# Patient Record
Sex: MALE | Race: WHITE
[De-identification: names, ages, dates, MRNs, and addresses within clinical notes are randomized per-mention and may not be internally consistent; named-entity substitution may affect disease eponyms.]

---

## 2017-03-24 PROBLEM — Z83.518 FAMILY HISTORY OF CATARACTS: Status: ACTIVE | Noted: 2017-03-24

## 2017-03-24 PROBLEM — Z82.49 FAMILY HISTORY OF CONGESTIVE HEART FAILURE: Status: ACTIVE | Noted: 2017-03-24

## 2017-03-24 PROBLEM — K63.5 COLON POLYP: Status: ACTIVE | Noted: 2017-03-24

## 2017-03-24 PROBLEM — Z92.89 HISTORY OF EKG: Status: RESOLVED | Noted: 2017-03-24 | Resolved: 2017-03-24

## 2017-03-24 PROBLEM — H18.609 KERATOCONUS: Status: ACTIVE | Noted: 2017-03-24

## 2017-04-11 ENCOUNTER — APPOINTMENT (OUTPATIENT)
Dept: INTERNAL MEDICINE | Facility: CLINIC | Age: 62
End: 2017-04-11

## 2017-04-11 ENCOUNTER — NON-APPOINTMENT (OUTPATIENT)
Age: 62
End: 2017-04-11

## 2017-04-11 VITALS
SYSTOLIC BLOOD PRESSURE: 128 MMHG | HEIGHT: 75 IN | WEIGHT: 256 LBS | OXYGEN SATURATION: 97 % | DIASTOLIC BLOOD PRESSURE: 88 MMHG | BODY MASS INDEX: 31.83 KG/M2 | HEART RATE: 88 BPM | RESPIRATION RATE: 18 BRPM | TEMPERATURE: 97.6 F

## 2017-04-11 DIAGNOSIS — N52.9 MALE ERECTILE DYSFUNCTION, UNSPECIFIED: ICD-10-CM

## 2017-05-31 ENCOUNTER — MEDICATION RENEWAL (OUTPATIENT)
Age: 62
End: 2017-05-31

## 2017-06-05 ENCOUNTER — MEDICATION RENEWAL (OUTPATIENT)
Age: 62
End: 2017-06-05

## 2017-10-08 ENCOUNTER — RECORD ABSTRACTING (OUTPATIENT)
Age: 62
End: 2017-10-08

## 2017-10-10 ENCOUNTER — APPOINTMENT (OUTPATIENT)
Dept: INTERNAL MEDICINE | Facility: CLINIC | Age: 62
End: 2017-10-10

## 2018-04-17 ENCOUNTER — APPOINTMENT (OUTPATIENT)
Dept: INTERNAL MEDICINE | Facility: CLINIC | Age: 63
End: 2018-04-17
Payer: COMMERCIAL

## 2018-04-17 VITALS
WEIGHT: 256 LBS | RESPIRATION RATE: 18 BRPM | HEART RATE: 88 BPM | HEIGHT: 75 IN | SYSTOLIC BLOOD PRESSURE: 118 MMHG | BODY MASS INDEX: 31.83 KG/M2 | OXYGEN SATURATION: 98 % | TEMPERATURE: 97.7 F | DIASTOLIC BLOOD PRESSURE: 82 MMHG

## 2018-04-17 PROCEDURE — 99396 PREV VISIT EST AGE 40-64: CPT

## 2018-04-17 RX ORDER — HYDROXYCHLOROQUINE SULFATE 200 MG/1
200 TABLET ORAL DAILY
Refills: 0 | Status: DISCONTINUED | COMMUNITY
End: 2018-04-17

## 2018-04-17 RX ORDER — LISDEXAMFETAMINE DIMESYLATE 60 MG/1
60 CAPSULE ORAL
Refills: 0 | Status: DISCONTINUED | COMMUNITY
End: 2018-04-17

## 2018-06-12 ENCOUNTER — MEDICATION RENEWAL (OUTPATIENT)
Age: 63
End: 2018-06-12

## 2018-08-08 ENCOUNTER — RESULT CHARGE (OUTPATIENT)
Age: 63
End: 2018-08-08

## 2018-08-09 ENCOUNTER — NON-APPOINTMENT (OUTPATIENT)
Age: 63
End: 2018-08-09

## 2018-08-09 ENCOUNTER — APPOINTMENT (OUTPATIENT)
Dept: INTERNAL MEDICINE | Facility: CLINIC | Age: 63
End: 2018-08-09
Payer: COMMERCIAL

## 2018-08-09 VITALS
TEMPERATURE: 98.8 F | DIASTOLIC BLOOD PRESSURE: 80 MMHG | HEIGHT: 75 IN | WEIGHT: 253 LBS | RESPIRATION RATE: 16 BRPM | OXYGEN SATURATION: 96 % | SYSTOLIC BLOOD PRESSURE: 122 MMHG | BODY MASS INDEX: 31.46 KG/M2 | HEART RATE: 84 BPM

## 2018-08-09 DIAGNOSIS — J30.9 ALLERGIC RHINITIS, UNSPECIFIED: ICD-10-CM

## 2018-08-09 PROCEDURE — 99214 OFFICE O/P EST MOD 30 MIN: CPT | Mod: 25

## 2018-08-09 PROCEDURE — 94060 EVALUATION OF WHEEZING: CPT

## 2018-08-09 RX ORDER — LORAZEPAM 0.5 MG/1
0.5 TABLET ORAL DAILY
Refills: 0 | Status: DISCONTINUED | COMMUNITY
End: 2018-08-09

## 2018-08-09 NOTE — HISTORY OF PRESENT ILLNESS
[FreeTextEntry8] : He has been experiencing intermittent cough for the past several months-year.  Cough can occur when he is anxious or talking.  Taking a deep breath often makes him cough.  No wheezing but he does feel  SOB at times.  Cough is dry with frequent throat clearing.  He does use flonase Nightly  for nasal congestion which helps.  He is unaware of other allergy symptoms.  There is no heartburn or GERD symptoms.

## 2018-08-09 NOTE — PHYSICAL EXAM
[General Appearance - Alert] : alert [General Appearance - In No Acute Distress] : in no acute distress [Neck Appearance] : the appearance of the neck was normal [Neck Cervical Mass (___cm)] : no neck mass was observed [Jugular Venous Distention Increased] : there was no jugular-venous distention [] : no respiratory distress [Auscultation Breath Sounds / Voice Sounds] : lungs were clear to auscultation bilaterally [Heart Rate And Rhythm] : heart rate was normal and rhythm regular [Heart Sounds] : normal S1 and S2 [Heart Sounds Gallop] : no gallops [Murmurs] : no murmurs [Heart Sounds Pericardial Friction Rub] : no pericardial rub [Arterial Pulses Carotid] : carotid pulses were normal with no bruits [FreeTextEntry1] : There is 1-2+ edema bilaterally

## 2018-08-09 NOTE — ASSESSMENT
[FreeTextEntry1] : \par Will start Qvar 80 2 inh bid\par Reviewed proper technique for inhaler usage and pt demonstrated use.  Side effects discussed and Good oral hygiene recommended.  \par Continue with Flonase Qd/\par FU 2 months as scheduled with PFT.  \par Call office any worsening symptoms.  \par

## 2018-08-09 NOTE — REVIEW OF SYSTEMS
[Nasal Discharge] : nasal discharge [Shortness Of Breath] : shortness of breath [Cough] : cough [Negative] : Cardiovascular [Wheezing] : no wheezing [Dyspnea on Exertion] : not dyspnea on exertion

## 2018-09-15 ENCOUNTER — RX RENEWAL (OUTPATIENT)
Age: 63
End: 2018-09-15

## 2018-10-23 ENCOUNTER — APPOINTMENT (OUTPATIENT)
Dept: INTERNAL MEDICINE | Facility: CLINIC | Age: 63
End: 2018-10-23

## 2018-10-24 ENCOUNTER — RX RENEWAL (OUTPATIENT)
Age: 63
End: 2018-10-24

## 2018-12-03 ENCOUNTER — APPOINTMENT (OUTPATIENT)
Dept: UROLOGY | Facility: CLINIC | Age: 63
End: 2018-12-03

## 2019-02-13 ENCOUNTER — RX RENEWAL (OUTPATIENT)
Age: 64
End: 2019-02-13

## 2019-04-19 ENCOUNTER — NON-APPOINTMENT (OUTPATIENT)
Age: 64
End: 2019-04-19

## 2019-04-19 ENCOUNTER — APPOINTMENT (OUTPATIENT)
Dept: CARDIOLOGY | Facility: CLINIC | Age: 64
End: 2019-04-19
Payer: COMMERCIAL

## 2019-04-19 VITALS
HEIGHT: 75 IN | TEMPERATURE: 98.2 F | SYSTOLIC BLOOD PRESSURE: 136 MMHG | BODY MASS INDEX: 31.71 KG/M2 | OXYGEN SATURATION: 95 % | HEART RATE: 96 BPM | DIASTOLIC BLOOD PRESSURE: 90 MMHG | WEIGHT: 255 LBS

## 2019-04-19 PROCEDURE — 93000 ELECTROCARDIOGRAM COMPLETE: CPT

## 2019-04-19 PROCEDURE — 99214 OFFICE O/P EST MOD 30 MIN: CPT | Mod: 25

## 2019-04-19 RX ORDER — BECLOMETHASONE DIPROPIONATE HFA 80 UG/1
80 AEROSOL, METERED RESPIRATORY (INHALATION)
Qty: 1 | Refills: 5 | Status: DISCONTINUED | COMMUNITY
Start: 2018-08-09 | End: 2019-04-19

## 2019-04-19 NOTE — DISCUSSION/SUMMARY
[FreeTextEntry1] : MV repair will order Echocardiogram today and moniter yearly\par \par Dilated AO: Will obtain echo as above ( Possible CTA Chest pending results ) \par \par Have advised Carotid US/Abd Aorta screening \par \par HTN: Controlled.  Advised weight reduction \par \par HLD: Patient Will obtain copy of labs from PCP \par \par OV 3 months

## 2019-04-19 NOTE — PHYSICAL EXAM
[General Appearance - Well Developed] : well developed [Normal Appearance] : normal appearance [Well Groomed] : well groomed [General Appearance - Well Nourished] : well nourished [No Deformities] : no deformities [General Appearance - In No Acute Distress] : no acute distress [Normal Conjunctiva] : the conjunctiva exhibited no abnormalities [] : no respiratory distress [Respiration, Rhythm And Depth] : normal respiratory rhythm and effort [Exaggerated Use Of Accessory Muscles For Inspiration] : no accessory muscle use [Auscultation Breath Sounds / Voice Sounds] : lungs were clear to auscultation bilaterally [Heart Rate And Rhythm] : heart rate and rhythm were normal [Heart Sounds] : normal S1 and S2 [Abdomen Soft] : soft [Abnormal Walk] : normal gait [Skin Color & Pigmentation] : normal skin color and pigmentation [Oriented To Time, Place, And Person] : oriented to person, place, and time [FreeTextEntry1] : No LE edema

## 2019-04-19 NOTE — REASON FOR VISIT
[Follow-Up - Clinic] : a clinic follow-up of [Hyperlipidemia] : hyperlipidemia [Hypertension] : hypertension [Medication Management] : Medication management [FreeTextEntry1] : MV repair

## 2019-04-19 NOTE — HISTORY OF PRESENT ILLNESS
[FreeTextEntry1] : Here today in routine cardiac follow up after a 3 year absence \par No CP/SOB/Palpitations\par Labs with PCP

## 2019-05-13 ENCOUNTER — APPOINTMENT (OUTPATIENT)
Dept: CARDIOLOGY | Facility: CLINIC | Age: 64
End: 2019-05-13
Payer: COMMERCIAL

## 2019-05-13 PROCEDURE — 93306 TTE W/DOPPLER COMPLETE: CPT

## 2019-05-16 ENCOUNTER — APPOINTMENT (OUTPATIENT)
Dept: CARDIOLOGY | Facility: CLINIC | Age: 64
End: 2019-05-16
Payer: COMMERCIAL

## 2019-05-16 PROCEDURE — 93880 EXTRACRANIAL BILAT STUDY: CPT

## 2019-05-21 ENCOUNTER — APPOINTMENT (OUTPATIENT)
Dept: CARDIOLOGY | Facility: CLINIC | Age: 64
End: 2019-05-21
Payer: COMMERCIAL

## 2019-05-21 PROCEDURE — 93978 VASCULAR STUDY: CPT

## 2019-06-03 ENCOUNTER — RX RENEWAL (OUTPATIENT)
Age: 64
End: 2019-06-03

## 2019-06-13 ENCOUNTER — APPOINTMENT (OUTPATIENT)
Dept: INTERNAL MEDICINE | Facility: CLINIC | Age: 64
End: 2019-06-13
Payer: COMMERCIAL

## 2019-06-13 ENCOUNTER — APPOINTMENT (OUTPATIENT)
Dept: CARDIOLOGY | Facility: CLINIC | Age: 64
End: 2019-06-13

## 2019-06-13 ENCOUNTER — INPATIENT (INPATIENT)
Facility: HOSPITAL | Age: 64
LOS: 4 days | Discharge: HOME | End: 2019-06-18
Attending: INTERNAL MEDICINE | Admitting: INTERNAL MEDICINE
Payer: COMMERCIAL

## 2019-06-13 VITALS
TEMPERATURE: 100.5 F | HEART RATE: 124 BPM | SYSTOLIC BLOOD PRESSURE: 114 MMHG | HEIGHT: 74 IN | DIASTOLIC BLOOD PRESSURE: 72 MMHG | RESPIRATION RATE: 20 BRPM | BODY MASS INDEX: 32.98 KG/M2 | WEIGHT: 257 LBS | OXYGEN SATURATION: 95 %

## 2019-06-13 VITALS — HEIGHT: 75 IN | WEIGHT: 255.07 LBS

## 2019-06-13 DIAGNOSIS — L03.116 CELLULITIS OF LEFT LOWER LIMB: ICD-10-CM

## 2019-06-13 DIAGNOSIS — Z98.890 OTHER SPECIFIED POSTPROCEDURAL STATES: Chronic | ICD-10-CM

## 2019-06-13 LAB
ALBUMIN SERPL ELPH-MCNC: 3.4 G/DL — SIGNIFICANT CHANGE UP (ref 3.3–5)
ALP SERPL-CCNC: 83 U/L — SIGNIFICANT CHANGE UP (ref 40–120)
ALT FLD-CCNC: 42 U/L — SIGNIFICANT CHANGE UP (ref 12–78)
ANION GAP SERPL CALC-SCNC: 9 MMOL/L — SIGNIFICANT CHANGE UP (ref 5–17)
APPEARANCE UR: CLEAR — SIGNIFICANT CHANGE UP
APTT BLD: 28.9 SEC — SIGNIFICANT CHANGE UP (ref 27.5–36.3)
AST SERPL-CCNC: 48 U/L — HIGH (ref 15–37)
BACTERIA # UR AUTO: ABNORMAL
BASOPHILS # BLD AUTO: 0.05 K/UL — SIGNIFICANT CHANGE UP (ref 0–0.2)
BASOPHILS NFR BLD AUTO: 0.4 % — SIGNIFICANT CHANGE UP (ref 0–2)
BILIRUB SERPL-MCNC: 1.2 MG/DL — SIGNIFICANT CHANGE UP (ref 0.2–1.2)
BILIRUB UR-MCNC: ABNORMAL
BUN SERPL-MCNC: 21 MG/DL — SIGNIFICANT CHANGE UP (ref 7–23)
CALCIUM SERPL-MCNC: 9 MG/DL — SIGNIFICANT CHANGE UP (ref 8.5–10.1)
CHLORIDE SERPL-SCNC: 102 MMOL/L — SIGNIFICANT CHANGE UP (ref 96–108)
CO2 SERPL-SCNC: 24 MMOL/L — SIGNIFICANT CHANGE UP (ref 22–31)
COLOR SPEC: YELLOW — SIGNIFICANT CHANGE UP
COMMENT - URINE: SIGNIFICANT CHANGE UP
CREAT SERPL-MCNC: 1.47 MG/DL — HIGH (ref 0.5–1.3)
DIFF PNL FLD: ABNORMAL
EOSINOPHIL # BLD AUTO: 0 K/UL — SIGNIFICANT CHANGE UP (ref 0–0.5)
EOSINOPHIL NFR BLD AUTO: 0 % — SIGNIFICANT CHANGE UP (ref 0–6)
EPI CELLS # UR: NEGATIVE — SIGNIFICANT CHANGE UP
GLUCOSE SERPL-MCNC: 115 MG/DL — HIGH (ref 70–99)
GLUCOSE UR QL: NEGATIVE MG/DL — SIGNIFICANT CHANGE UP
HCT VFR BLD CALC: 42.9 % — SIGNIFICANT CHANGE UP (ref 39–50)
HGB BLD-MCNC: 14.6 G/DL — SIGNIFICANT CHANGE UP (ref 13–17)
IMM GRANULOCYTES NFR BLD AUTO: 0.9 % — SIGNIFICANT CHANGE UP (ref 0–1.5)
INR BLD: 1.23 RATIO — HIGH (ref 0.88–1.16)
KETONES UR-MCNC: ABNORMAL
LACTATE SERPL-SCNC: 1 MMOL/L — SIGNIFICANT CHANGE UP (ref 0.7–2)
LEUKOCYTE ESTERASE UR-ACNC: ABNORMAL
LYMPHOCYTES # BLD AUTO: 0.92 K/UL — LOW (ref 1–3.3)
LYMPHOCYTES # BLD AUTO: 6.8 % — LOW (ref 13–44)
MCHC RBC-ENTMCNC: 30.7 PG — SIGNIFICANT CHANGE UP (ref 27–34)
MCHC RBC-ENTMCNC: 34 GM/DL — SIGNIFICANT CHANGE UP (ref 32–36)
MCV RBC AUTO: 90.1 FL — SIGNIFICANT CHANGE UP (ref 80–100)
MONOCYTES # BLD AUTO: 0.49 K/UL — SIGNIFICANT CHANGE UP (ref 0–0.9)
MONOCYTES NFR BLD AUTO: 3.6 % — SIGNIFICANT CHANGE UP (ref 2–14)
NEUTROPHILS # BLD AUTO: 11.91 K/UL — HIGH (ref 1.8–7.4)
NEUTROPHILS NFR BLD AUTO: 88.3 % — HIGH (ref 43–77)
NITRITE UR-MCNC: NEGATIVE — SIGNIFICANT CHANGE UP
PH UR: 6.5 — SIGNIFICANT CHANGE UP (ref 5–8)
PLATELET # BLD AUTO: 129 K/UL — LOW (ref 150–400)
POTASSIUM SERPL-MCNC: 4.5 MMOL/L — SIGNIFICANT CHANGE UP (ref 3.5–5.3)
POTASSIUM SERPL-SCNC: 4.5 MMOL/L — SIGNIFICANT CHANGE UP (ref 3.5–5.3)
PROT SERPL-MCNC: 7.5 GM/DL — SIGNIFICANT CHANGE UP (ref 6–8.3)
PROT UR-MCNC: 100 MG/DL
PROTHROM AB SERPL-ACNC: 13.7 SEC — HIGH (ref 10–12.9)
RBC # BLD: 4.76 M/UL — SIGNIFICANT CHANGE UP (ref 4.2–5.8)
RBC # FLD: 12.3 % — SIGNIFICANT CHANGE UP (ref 10.3–14.5)
RBC CASTS # UR COMP ASSIST: ABNORMAL /HPF (ref 0–4)
SODIUM SERPL-SCNC: 135 MMOL/L — SIGNIFICANT CHANGE UP (ref 135–145)
SP GR SPEC: 1.01 — SIGNIFICANT CHANGE UP (ref 1.01–1.02)
UROBILINOGEN FLD QL: 8 MG/DL
WBC # BLD: 13.49 K/UL — HIGH (ref 3.8–10.5)
WBC # FLD AUTO: 13.49 K/UL — HIGH (ref 3.8–10.5)
WBC UR QL: SIGNIFICANT CHANGE UP

## 2019-06-13 PROCEDURE — 93010 ELECTROCARDIOGRAM REPORT: CPT

## 2019-06-13 PROCEDURE — 93971 EXTREMITY STUDY: CPT | Mod: 26,LT

## 2019-06-13 PROCEDURE — 99285 EMERGENCY DEPT VISIT HI MDM: CPT

## 2019-06-13 PROCEDURE — 99213 OFFICE O/P EST LOW 20 MIN: CPT

## 2019-06-13 PROCEDURE — 71045 X-RAY EXAM CHEST 1 VIEW: CPT | Mod: 26

## 2019-06-13 RX ORDER — TAMSULOSIN HYDROCHLORIDE 0.4 MG/1
1 CAPSULE ORAL
Qty: 0 | Refills: 0 | DISCHARGE

## 2019-06-13 RX ORDER — CEFAZOLIN SODIUM 1 G
1000 VIAL (EA) INJECTION EVERY 8 HOURS
Refills: 0 | Status: DISCONTINUED | OUTPATIENT
Start: 2019-06-13 | End: 2019-06-13

## 2019-06-13 RX ORDER — TAMSULOSIN HYDROCHLORIDE 0.4 MG/1
0.4 CAPSULE ORAL DAILY
Refills: 0 | Status: DISCONTINUED | OUTPATIENT
Start: 2019-06-13 | End: 2019-06-18

## 2019-06-13 RX ORDER — VANCOMYCIN HCL 1 G
1750 VIAL (EA) INTRAVENOUS ONCE
Refills: 0 | Status: COMPLETED | OUTPATIENT
Start: 2019-06-13 | End: 2019-06-13

## 2019-06-13 RX ORDER — OMEGA-3 ACID ETHYL ESTERS 1 G
2 CAPSULE ORAL
Qty: 0 | Refills: 0 | DISCHARGE

## 2019-06-13 RX ORDER — CEFAZOLIN SODIUM 1 G
2000 VIAL (EA) INJECTION ONCE
Refills: 0 | Status: COMPLETED | OUTPATIENT
Start: 2019-06-13 | End: 2019-06-13

## 2019-06-13 RX ORDER — UBIDECARENONE 100 MG
100 CAPSULE ORAL
Qty: 0 | Refills: 0 | DISCHARGE

## 2019-06-13 RX ORDER — SODIUM CHLORIDE 9 MG/ML
1000 INJECTION, SOLUTION INTRAVENOUS
Refills: 0 | Status: DISCONTINUED | OUTPATIENT
Start: 2019-06-13 | End: 2019-06-15

## 2019-06-13 RX ORDER — ACETAMINOPHEN 500 MG
650 TABLET ORAL ONCE
Refills: 0 | Status: COMPLETED | OUTPATIENT
Start: 2019-06-13 | End: 2019-06-13

## 2019-06-13 RX ORDER — ATORVASTATIN CALCIUM 80 MG/1
20 TABLET, FILM COATED ORAL DAILY
Refills: 0 | Status: DISCONTINUED | OUTPATIENT
Start: 2019-06-13 | End: 2019-06-18

## 2019-06-13 RX ORDER — ROSUVASTATIN CALCIUM 5 MG/1
1 TABLET ORAL
Qty: 0 | Refills: 0 | DISCHARGE

## 2019-06-13 RX ORDER — LOSARTAN POTASSIUM 100 MG/1
1 TABLET, FILM COATED ORAL
Qty: 0 | Refills: 0 | DISCHARGE

## 2019-06-13 RX ORDER — CHOLECALCIFEROL (VITAMIN D3) 125 MCG
2 CAPSULE ORAL
Qty: 0 | Refills: 0 | DISCHARGE

## 2019-06-13 RX ORDER — FLUTICASONE PROPIONATE 50 MCG
1 SPRAY, SUSPENSION NASAL
Qty: 0 | Refills: 0 | DISCHARGE

## 2019-06-13 RX ORDER — SODIUM CHLORIDE 9 MG/ML
3600 INJECTION, SOLUTION INTRAVENOUS ONCE
Refills: 0 | Status: COMPLETED | OUTPATIENT
Start: 2019-06-13 | End: 2019-06-13

## 2019-06-13 RX ORDER — FLUTICASONE PROPIONATE 50 MCG
1 SPRAY, SUSPENSION NASAL
Refills: 0 | Status: DISCONTINUED | OUTPATIENT
Start: 2019-06-13 | End: 2019-06-18

## 2019-06-13 RX ORDER — MUPIROCIN 20 MG/G
1 OINTMENT TOPICAL
Refills: 0 | Status: DISCONTINUED | OUTPATIENT
Start: 2019-06-13 | End: 2019-06-18

## 2019-06-13 RX ORDER — CEFAZOLIN SODIUM 1 G
1000 VIAL (EA) INJECTION EVERY 8 HOURS
Refills: 0 | Status: DISCONTINUED | OUTPATIENT
Start: 2019-06-13 | End: 2019-06-14

## 2019-06-13 RX ORDER — ACETAMINOPHEN 500 MG
1000 TABLET ORAL EVERY 6 HOURS
Refills: 0 | Status: DISCONTINUED | OUTPATIENT
Start: 2019-06-13 | End: 2019-06-18

## 2019-06-13 RX ADMIN — Medication 1000 MILLIGRAM(S): at 21:01

## 2019-06-13 RX ADMIN — SODIUM CHLORIDE 3600 MILLILITER(S): 9 INJECTION, SOLUTION INTRAVENOUS at 11:27

## 2019-06-13 RX ADMIN — Medication 2000 MILLIGRAM(S): at 11:57

## 2019-06-13 RX ADMIN — Medication 1 APPLICATION(S): at 19:23

## 2019-06-13 RX ADMIN — Medication 100 MILLIGRAM(S): at 11:27

## 2019-06-13 RX ADMIN — Medication 250 MILLIGRAM(S): at 12:03

## 2019-06-13 RX ADMIN — Medication 650 MILLIGRAM(S): at 11:28

## 2019-06-13 RX ADMIN — ATORVASTATIN CALCIUM 20 MILLIGRAM(S): 80 TABLET, FILM COATED ORAL at 21:01

## 2019-06-13 RX ADMIN — SODIUM CHLORIDE 100 MILLILITER(S): 9 INJECTION, SOLUTION INTRAVENOUS at 17:36

## 2019-06-13 RX ADMIN — Medication 1000 MILLIGRAM(S): at 21:37

## 2019-06-13 RX ADMIN — Medication 1000 MILLIGRAM(S): at 21:07

## 2019-06-13 RX ADMIN — TAMSULOSIN HYDROCHLORIDE 0.4 MILLIGRAM(S): 0.4 CAPSULE ORAL at 21:01

## 2019-06-13 NOTE — REVIEW OF SYSTEMS
[Chills] : chills [Fever] : fever [Fatigue] : fatigue [Skin Rash] : skin rash [Negative] : Psychiatric [de-identified] : see HPi

## 2019-06-13 NOTE — H&P ADULT - ATTENDING COMMENTS
Hospitalist Update Note    Patient seen and examined, case discussed in depth with NP and agree with H&P stated above along with the following:     Alee 63 y.o M admitted for LLE cellulitis. Patient endorses last had cellulitis in same leg 20 years ago at which point he was hospitalized for 5 days on IV abx. NO CP, mild LLE pain.     VS reviewed.   Gen: awake, alert, NAD, nontoxic appearing  CV: +S1, S2, RRR  Resp: CTA B/L  Abd: soft/NT/ND, +BS  Ext: LLE + erythema with edema from foot throughout calf, mild pain. Intact motor strength.     A&P     # LLE Cellulitis - discussed with podiatry plan for ongoing IV abx, elevation of leg.   - doppler US neg for DVT  - continue IV Ancef, f/u final blood cultures.   - consult ID for recs.    Rest per above.

## 2019-06-13 NOTE — H&P ADULT - HISTORY OF PRESENT ILLNESS
63 year old Man with hx of Mitral valve repair 2009 at [facility], HTN, HLD, BPH appendectomy, referred to ED from PCP's office for LLE redness/swelling, pain, fever (101), chills with onset Tuesday. Denies CP/palpitation/SOB/HA/dizziness/abd pain/n/v/d, has abrasion to left heel from ill fitting shoe he recently bought, walks barefooted outdoor his property at times; no recent travels.    In the ED t ((.0 , /70, RR 19, O2 sat 94% rm, lactate 1.0, WBC 13.49, platelet 129, crt 1.47, Ua no WBC, few bacteria, negative nitrile trace LE. He received ancef IVPB 2gm, vanco 1750 IVP x1, tylenol 650mg x1, and LR 2L bolus

## 2019-06-13 NOTE — H&P ADULT - ASSESSMENT
LLE cellulitis / pain likely staph infection w/ cracked plantar areas  hx of recurrent LLE cellulitis last episode was 20 yrs ago  f/u blood culture  ancef 1gm Q 8hrs  vitals per routine  LLE ultrasound to r/o DVT  ck crp / sed rate  ck A1c  monitor for now / consult ID if needed in AM     Thrombocytopenia ? acute in the setting of infection vs chronic  baseline 166 in 2015  monitor for now  ck CBC n am    CARLOS likely d/t volume depletion  Ua does not indicate UTI  denies dysuria/urgency/ has increase urination HS chronically in setting of BPH  hold home dose losartan today  start  cc/ hr x 24hrs  monitor BMP    L heel superficial abrasion  local wound care w/ bacitracin    Onychomycosis  podiatry consult  Lamisil oral or topical if oral not formulary    HTN - controlled  borderline low  monitor VS per floor routine    Dyslipidemia  con't home dose crestor    Dispo  goals of care d/w patient --> full code  pt and fiancee agreed with discussed plan of care

## 2019-06-13 NOTE — PHYSICAL EXAM
[No Acute Distress] : no acute distress [Well Nourished] : well nourished [Well Developed] : well developed [Supple] : supple [No Lymphadenopathy] : no lymphadenopathy [No Respiratory Distress] : no respiratory distress  [Clear to Auscultation] : lungs were clear to auscultation bilaterally [No Accessory Muscle Use] : no accessory muscle use [Normal Rate] : normal rate  [Regular Rhythm] : with a regular rhythm [Normal S1, S2] : normal S1 and S2 [Pedal Pulses Present] : the pedal pulses are present [Normal Posterior Cervical Nodes] : no posterior cervical lymphadenopathy [Normal Anterior Cervical Nodes] : no anterior cervical lymphadenopathy [de-identified] : left ankle extending to knee + erythema, + warmth, + swelling. pt has small 1/2 inch superficial  open area on heel, no drainage noted

## 2019-06-13 NOTE — ED ADULT NURSE NOTE - OBJECTIVE STATEMENT
Pt. c/o of pain to LLE, redness and swelling. Pt. states he has had fever for the last few days and felt tired. Pt. S.O. reports he was very "grey last night" pt. went to PCP and was sent in for eval. Pt. took 1000mg tylenol yesterday and nyquil to sleep last night. Pt. took nothing today. Pt. reports feeling slight HA with dizziness, chest discomfort. pt. denies abd. pain, n/v/d/.

## 2019-06-13 NOTE — H&P ADULT - NSHPPHYSICALEXAM_GEN_ALL_CORE
PHYSICAL EXAM:    GENERAL: NAD    HEENT:  pupils equal and reactive, EOMI, no oropharyngeal lesions, erythema, exudates, oral thrush    NECK:   supple, no carotid bruits, no palpable lymph nodes, no thyromegaly    CV:  +S1, +S2, regular,  no m/c/r    RESP:   lungs clear to auscultation bilaterally, no wheezing, rales, rhonchi, good air entry bilaterally    BREAST:  not examined    GI:  abdomen soft, non-tender, non-distended, normal BS, no bruits, no abdominal masses, no palpable masses    RECTAL:  not examined    :  not examined    MSK: normal muscle tone, no atrophy, no rigidity, no contractions    EXT: chronic vascular changes to BLE,  no clubbing, no cyanosis, no edema, no calf pain, swelling or erythema    VASCULAR:  diminished palpable DP/PT to LLE otherwise normal pulses equal and symmetric in the upper and right lower extremities    NEURO:  AAOX3, no focal neurological deficits, follows all commands, able to move extremities spontaneously    SKIN: plantar area of BLE with cracked dry areas, L great toe fungal appearing underneath nailbed, Left leg moderately swollen, erythemous from wrist of leg upward below the knee + tenderness extending to thigh, back of L heel with superficial abrasion

## 2019-06-13 NOTE — ED ADULT TRIAGE NOTE - CHIEF COMPLAINT QUOTE
patient reports cellulitis in LLE sent by Dr Sahni for evaluation and tx.  States he has been febrile for the past few days.  100.5 today

## 2019-06-13 NOTE — ED STATDOCS - CLINICAL SUMMARY MEDICAL DECISION MAKING FREE TEXT BOX
63 M with fever and LLE cellulitis. Labs, blood cultures, IV abx. Admit. 63 M with fever and LLE cellulitis. Labs, EKG, blood cultures, IV abx. Admit.

## 2019-06-13 NOTE — HISTORY OF PRESENT ILLNESS
[FreeTextEntry8] : Pt presents  to the office today with complaints of left lower extremity redness and swelling extending from ankle to thigh. Pt reports felt ill two days ago with fever ( 101 F) and chills and noticed his left calf was red and swollen. He does have a history of cellulitis twice in the past, requiring hospitalization and IV antibiotics . Today in the office his temp is 100.6 F .\par  He denies shortness of breath, chest pain, pleuritic pain , nausea, vomiting. \par

## 2019-06-13 NOTE — ED STATDOCS - OBJECTIVE STATEMENT
64 y/o M with a PMHx of HTN, HLD, appendectomy, mitral valve repair presenting to the ED sent by MD Sahni for evaluation and treatment of possible cellulitis in LLE. Pt states he has also had a fever for the past 2 days. Pt notes that 2 days ago he experienced the chills, became pale, and developed a fever. Last night, pt states that his L leg began to hurt and he noticed redness. This morning, pt saw MD Sahni who sent pt to ED. Pt notes he has an open blister on his L heel from new shoes. Tetanus vaccine UTD. Denies N/V, tobacco use, or EtOH use. Pt is not anticoagulated. NKDA.

## 2019-06-13 NOTE — ED STATDOCS - PROGRESS NOTE DETAILS
64 y/o M presents with L leg pain. Pt reports fevers/rigors since tuesday. Reports L leg pain and and redness, similar to prior episodes of cellulitis. Pt saw Dr. edward in this morning and was sent to ED. -Rell Bui PA-C Will give fluids based on IBW for total of 2520cc. -Rell Bui PA-C Will admit for sepsis secondary to cellulitis. Case discussed with Dr. jameson. -Rell Bui PA-C

## 2019-06-13 NOTE — CONSULT NOTE ADULT - SUBJECTIVE AND OBJECTIVE BOX
CHIEF COMPLAINT: Fungal Toenails  Date of Service: 6/13/19     S : 63 year old Male seen at bedside for evaluation of feet by Podiatry. Patient states he was sent in by his PMD for redness and swelling of the left leg which he states began 1 day ago. Patient relates approximately 2 weeks ago he notices a small wound had developed on his L heel which he believes was caused by wearing new shoewear which he states was too small for his feet. Patient denies any drainage from the wound at any time. Patient reports he has not seen an outside Podiatrist for treatment. Patient denies any f/n/v/c/sob/cp at this time.    PMH: HTN (hypertension)  HLD (hyperlipidemia)  HTN (hypertension)  HLD (hyperlipidemia)    PSH:S/P MVR (mitral valve repair)      Allergies:No Known Allergies    Labs:                          14.6   13.49 )-----------( 129      ( 13 Jun 2019 11:11 )             42.9     WBC Trend  13.49<H> Date (06-13 @ 11:11)      Chem  06-13    135  |  102  |  21  ----------------------------<  115<H>  4.5   |  24  |  1.47<H>    Ca    9.0      13 Jun 2019 11:11    TPro  7.5  /  Alb  3.4  /  TBili  1.2  /  DBili  x   /  AST  48<H>  /  ALT  42  /  AlkPhos  83  06-13    Vitals:  T(F): 98.4 (06-13-19 @ 16:11), Max: 99.3 (06-13-19 @ 10:40)  HR: 78 (06-13-19 @ 16:11) (78 - 113)  BP: 112/69 (06-13-19 @ 16:11) (108/70 - 112/69)  RR: 16 (06-13-19 @ 16:11) (16 - 19)  SpO2: 95% (06-13-19 @ 16:11) (94% - 96%)  Wt(kg): --    REVIEW OF SYSTEMS:    CONSTITUTIONAL: No weakness, fevers or chills  EYES/ENT: No visual changes;  No vertigo or throat pain   NECK: No pain or stiffness  RESPIRATORY: No cough, wheezing, hemoptysis; No shortness of breath  CARDIOVASCULAR: No chest pain or palpitations  GASTROINTESTINAL: No abdominal or epigastric pain. No nausea, vomiting, or hematemesis; No diarrhea or constipation. No melena or hematochezia.  GENITOURINARY: No dysuria, frequency or hematuria  NEUROLOGICAL: No numbness or weakness  SKIN: Wound to right heel  All other review of systems is negative unless indicated above    Physical Exam:   Constitutional: NAD, alert;  Derm:    Ulceration medial aspect of L heel, fibrogranular in nature, - hyperkeratotic border, wound size approx. 0.8 cm X 1.1 cm X  0.1 cm in depth, + edema, + riki-wound erythema, - purulence, - fluctuance, - tracking/tunneling, - probe to bone, no active drainage. Erythema noted to the periwound area of the left heel wound as well as to the LLE.  Toenails dystrophic and yellow x 10 with subungual debris. Hyperkeratotic tissue at the plantar aspects of the toe pulps of digits 4+5 b/l.   Vascular: Dorsalis Pedis and Posterior Tibial pulses 1/4 b/l.  Capillary re-fill time less then 3 seconds digits 1-5 bilateral.    Neuro: Protective sensation intact to the level of the digits bilateral.  MSK: Muscle strength 5/5 all major muscle groups bilateral. Mild pain elicited with squeezing of left calf.

## 2019-06-13 NOTE — ED ADULT NURSE REASSESSMENT NOTE - NS ED NURSE REASSESS COMMENT FT1
patient provided with menu to order lunch. no change in condition. comfort and safety maintained. patient awaiting admission orders.

## 2019-06-13 NOTE — CONSULT NOTE ADULT - ASSESSMENT
Assessment:  Patient is a 64 Y/O Male seen by Podiatry for the followin. Fibrogranular wound to left heel, superficial in nature  2. LLE cellulitis  3. Onychomycosis of toenails 1-10  4. Pain in the b/l feet and toes secondary to onychomycosis 1-10     P:   Chart reviewed and Patient evaluated by Podiatry dep.  Discussed diagnosis and treatment with patient  Wound to L heel cleansed with normal saline  Wound to left heel was dressed with DSD to left heel. Bacitracin, 4x4 gauze, ABD pad, and Kerlix applied to left heel wound.  Aseptic mechanical debridement of toenails x 10 performed with sterile nail clippers.   Clotrimazole cream ordered by Medicine team. Nursing order placed for clotrimazole cream to be applied to toenails 1-10 every morning and night.   Order for Bactroban to be applied to left heel placed.  Care by Medicine appreciated.  Venous duplex result reviewed; neg. for DVT in LLE.   Offloading to bilateral Heels encouraged by explaining to patient he should apply pillows underneath calfs to suspend b/l heels in air for offloading.   Nursing order placed to dispense surgical shoe to left foot for offloading. Patient is to wear surgical shoe to left foot at all times whenever ambulating.   Discussed importance of daily foot examinations and proper shoe gear.  Patient demonstrated verbal understanding of all interventions and tolerated interventions well without any complications.   Podiatry will follow while in house.

## 2019-06-14 LAB
ANION GAP SERPL CALC-SCNC: 10 MMOL/L — SIGNIFICANT CHANGE UP (ref 5–17)
BUN SERPL-MCNC: 17 MG/DL — SIGNIFICANT CHANGE UP (ref 7–23)
CALCIUM SERPL-MCNC: 8.5 MG/DL — SIGNIFICANT CHANGE UP (ref 8.5–10.1)
CHLORIDE SERPL-SCNC: 107 MMOL/L — SIGNIFICANT CHANGE UP (ref 96–108)
CO2 SERPL-SCNC: 24 MMOL/L — SIGNIFICANT CHANGE UP (ref 22–31)
CREAT SERPL-MCNC: 1.25 MG/DL — SIGNIFICANT CHANGE UP (ref 0.5–1.3)
CRP SERPL-MCNC: 18.67 MG/DL — HIGH (ref 0–0.4)
ERYTHROCYTE [SEDIMENTATION RATE] IN BLOOD: 35 MM/HR — HIGH (ref 0–20)
GLUCOSE SERPL-MCNC: 144 MG/DL — HIGH (ref 70–99)
HCT VFR BLD CALC: 38.1 % — LOW (ref 39–50)
HCV AB S/CO SERPL IA: 0.08 S/CO — SIGNIFICANT CHANGE UP (ref 0–0.99)
HCV AB SERPL-IMP: SIGNIFICANT CHANGE UP
HGB BLD-MCNC: 12.8 G/DL — LOW (ref 13–17)
MCHC RBC-ENTMCNC: 30.5 PG — SIGNIFICANT CHANGE UP (ref 27–34)
MCHC RBC-ENTMCNC: 33.6 GM/DL — SIGNIFICANT CHANGE UP (ref 32–36)
MCV RBC AUTO: 90.7 FL — SIGNIFICANT CHANGE UP (ref 80–100)
PLATELET # BLD AUTO: 108 K/UL — LOW (ref 150–400)
POTASSIUM SERPL-MCNC: 3.9 MMOL/L — SIGNIFICANT CHANGE UP (ref 3.5–5.3)
POTASSIUM SERPL-SCNC: 3.9 MMOL/L — SIGNIFICANT CHANGE UP (ref 3.5–5.3)
RBC # BLD: 4.2 M/UL — SIGNIFICANT CHANGE UP (ref 4.2–5.8)
RBC # FLD: 12.4 % — SIGNIFICANT CHANGE UP (ref 10.3–14.5)
SODIUM SERPL-SCNC: 141 MMOL/L — SIGNIFICANT CHANGE UP (ref 135–145)
WBC # BLD: 7.52 K/UL — SIGNIFICANT CHANGE UP (ref 3.8–10.5)
WBC # FLD AUTO: 7.52 K/UL — SIGNIFICANT CHANGE UP (ref 3.8–10.5)

## 2019-06-14 RX ORDER — VANCOMYCIN HCL 1 G
1250 VIAL (EA) INTRAVENOUS EVERY 12 HOURS
Refills: 0 | Status: DISCONTINUED | OUTPATIENT
Start: 2019-06-14 | End: 2019-06-18

## 2019-06-14 RX ORDER — CEFAZOLIN SODIUM 1 G
2000 VIAL (EA) INJECTION EVERY 8 HOURS
Refills: 0 | Status: DISCONTINUED | OUTPATIENT
Start: 2019-06-14 | End: 2019-06-18

## 2019-06-14 RX ORDER — HEPARIN SODIUM 5000 [USP'U]/ML
5000 INJECTION INTRAVENOUS; SUBCUTANEOUS EVERY 8 HOURS
Refills: 0 | Status: DISCONTINUED | OUTPATIENT
Start: 2019-06-14 | End: 2019-06-18

## 2019-06-14 RX ORDER — CEFAZOLIN SODIUM 1 G
2000 VIAL (EA) INJECTION EVERY 8 HOURS
Refills: 0 | Status: DISCONTINUED | OUTPATIENT
Start: 2019-06-14 | End: 2019-06-14

## 2019-06-14 RX ORDER — VANCOMYCIN HCL 1 G
1250 VIAL (EA) INTRAVENOUS ONCE
Refills: 0 | Status: COMPLETED | OUTPATIENT
Start: 2019-06-14 | End: 2019-06-14

## 2019-06-14 RX ORDER — VANCOMYCIN HCL 1 G
VIAL (EA) INTRAVENOUS
Refills: 0 | Status: DISCONTINUED | OUTPATIENT
Start: 2019-06-14 | End: 2019-06-18

## 2019-06-14 RX ADMIN — Medication 100 MILLIGRAM(S): at 21:15

## 2019-06-14 RX ADMIN — HEPARIN SODIUM 5000 UNIT(S): 5000 INJECTION INTRAVENOUS; SUBCUTANEOUS at 21:15

## 2019-06-14 RX ADMIN — Medication 1000 MILLIGRAM(S): at 06:00

## 2019-06-14 RX ADMIN — SODIUM CHLORIDE 100 MILLILITER(S): 9 INJECTION, SOLUTION INTRAVENOUS at 02:30

## 2019-06-14 RX ADMIN — TAMSULOSIN HYDROCHLORIDE 0.4 MILLIGRAM(S): 0.4 CAPSULE ORAL at 11:33

## 2019-06-14 RX ADMIN — Medication 166.67 MILLIGRAM(S): at 18:01

## 2019-06-14 RX ADMIN — MUPIROCIN 1 APPLICATION(S): 20 OINTMENT TOPICAL at 09:08

## 2019-06-14 RX ADMIN — ATORVASTATIN CALCIUM 20 MILLIGRAM(S): 80 TABLET, FILM COATED ORAL at 11:33

## 2019-06-14 RX ADMIN — Medication 1 SPRAY(S): at 21:16

## 2019-06-14 RX ADMIN — Medication 100 MILLIGRAM(S): at 14:06

## 2019-06-14 RX ADMIN — Medication 1 APPLICATION(S): at 18:02

## 2019-06-14 RX ADMIN — Medication 166.67 MILLIGRAM(S): at 11:33

## 2019-06-14 RX ADMIN — Medication 1 APPLICATION(S): at 06:00

## 2019-06-14 NOTE — CONSULT NOTE ADULT - ASSESSMENT
63 year old Man with hx of Mitral valve repair 2009 at [facility], HTN, HLD, BPH appendectomy, referred to ED from PCP's office for LLE redness/swelling, pain, fever (101), chills with onset 6/11. Denies CP/palpitation/SOB/HA/dizziness/abd pain/n/v/d, has abrasion to left heel from ill fitting shoe he recently bought, walks barefooted outdoor his property at times; no recent travels. In the ED t (.0 , /70, RR 19, O2 sat 94% rm, lactate 1.0, WBC 13.49, platelet 129, crt 1.47, Ua no WBC, few bacteria, negative nitrile trace LE. He received ancef IVPB 2gm, vanco 1750 IVP x1, tylenol 650mg x1, and LR 2L bolus.     1. LLE cellulitis. toenail onychomycosis  - agree with vancomycin [id number] check trough prior to 4th dose  - agree with ancef 2gmq8h  - f/u cultures  - podiatry eval appreciated  - continue with topical clotrimazole  - monitor temps  - tolerating abx well so far; no side effects noted  - reason for abx use and side effects reviewed with patient  -supportive care  - fu cbc  - on dc plan for po keflex [id number] to complete abx course

## 2019-06-14 NOTE — CONSULT NOTE ADULT - SUBJECTIVE AND OBJECTIVE BOX
Patient is a 63y old  Male who presents with a chief complaint of LLE cellulits (2019 09:38)    HPI:  63 year old Man with hx of Mitral valve repair  at [facility], HTN, HLD, BPH appendectomy, referred to ED from PCP's office for LLE redness/swelling, pain, fever (101), chills with onset . Denies CP/palpitation/SOB/HA/dizziness/abd pain/n/v/d, has abrasion to left heel from ill fitting shoe he recently bought, walks barefooted outdoor his property at times; no recent travels. In the ED t (.0 , /70, RR 19, O2 sat 94% rm, lactate 1.0, WBC 13.49, platelet 129, crt 1.47, Ua no WBC, few bacteria, negative nitrile trace LE. He received ancef IVPB 2gm, vanco 1750 IVP x1, tylenol 650mg x1, and LR 2L bolus.       PMH: as above  PSH: as above  Meds: per reconciliation sheet, noted below  MEDICATIONS  (STANDING):  atorvastatin 20 milliGRAM(s) Oral daily  ceFAZolin  Injectable. 1000 milliGRAM(s) IV Push every 8 hours  clotrimazole 1% Cream 1 Application(s) Topical two times a day  mupirocin 2% Ointment 1 Application(s) Topical <User Schedule>  sodium chloride 0.45%. 1000 milliLiter(s) (100 mL/Hr) IV Continuous <Continuous>  tamsulosin 0.4 milliGRAM(s) Oral daily  vancomycin  IVPB 1250 milliGRAM(s) IV Intermittent once  vancomycin  IVPB 1250 milliGRAM(s) IV Intermittent every 12 hours  vancomycin  IVPB        Allergies    No Known Allergies    Intolerances      Social: no smoking, no alcohol, no illegal drugs; no recent travel, no exposure to TB  FAMILY HISTORY:  FH: brain tumor     no history of premature cardiovascular disease in first degree relatives    ROS: no HA, no dizziness, no sore throat, no blurry vision, no CP, no palpitations, no SOB, no cough, no abdominal pain, no diarrhea, no N/V, no dysuria, no leg pain, no claudication, no rectal pain or bleeding, no night sweats  All other systems reviewed and are negative    Vital Signs Last 24 Hrs  T(C): 36.9 (2019 11:02), Max: 38.2 (2019 21:33)  T(F): 98.4 (2019 11:02), Max: 100.7 (2019 21:33)  HR: 80 (2019 11:02) (78 - 85)  BP: 139/74 (2019 11:02) (112/68 - 148/81)  BP(mean): --  RR: 18 (2019 11:02) (16 - 18)  SpO2: 97% (2019 11:02) (92% - 99%)  Daily         PE:  Constitutional: frail looking  HEENT: NC/AT, EOMI, PERRLA, conjunctivae clear; ears and nose atraumatic; pharynx benign  Neck: supple; thyroid not palpable  Back: no tenderness  Respiratory: respiratory effort normal; clear to auscultation  Cardiovascular: S1S2 regular, no murmurs  Abdomen: soft, not tender, not distended, positive BS; liver and spleen WNL  Genitourinary: no suprapubic tenderness  Lymphatic: no LN palpable  Musculoskeletal: no muscle tenderness, no joint swelling or tenderness  Extremities: no pedal edema  Neurological/ Psychiatric:  moving all extremities  Skin: LLE redness/swelling, warmth toe nail changes onychomycosis no palpable lesions    Labs: all available labs reviewed                        12.8   7.52  )-----------( 108      ( 2019 08:19 )             38.1     06-14    141  |  107  |  17  ----------------------------<  144<H>  3.9   |  24  |  1.25    Ca    8.5      2019 08:19    TPro  7.5  /  Alb  3.4  /  TBili  1.2  /  DBili  x   /  AST  48<H>  /  ALT  42  /  AlkPhos  83  06-13     LIVER FUNCTIONS - ( 2019 11:11 )  Alb: 3.4 g/dL / Pro: 7.5 gm/dL / ALK PHOS: 83 U/L / ALT: 42 U/L / AST: 48 U/L / GGT: x           Urinalysis Basic - ( 2019 11:11 )    Color: Yellow / Appearance: Clear / S.010 / pH: x  Gluc: x / Ketone: Trace  / Bili: Small / Urobili: 8 mg/dL   Blood: x / Protein: 100 mg/dL / Nitrite: Negative   Leuk Esterase: Trace / RBC: 3-5 /HPF / WBC 3-5   Sq Epi: x / Non Sq Epi: Negative / Bacteria: Few          Radiology: all available radiological tests reviewed    EXAM:  US DPLX LWR EXT VEINS LTD LT                            PROCEDURE DATE:  2019          INTERPRETATION:  CLINICAL INFORMATION: Left lower extremity swelling   redness and tenderness    COMPARISON: None available.    TECHNIQUE: Duplex sonography of the LEFT LOWER extremity with color and   spectral Doppler, with and without compression.      FINDINGS:    There is normal compressibility of the left common femoral, femoral and   popliteal veins.     The contralateral common femoral vein is patent.    Doppler examination shows normal spontaneous and phasic flow.    No calf vein thrombosis is detected.    IMPRESSION:     No evidence of left lower extremity deep venous thrombosis.      Advanced directives addressed: full resuscitation

## 2019-06-14 NOTE — PROGRESS NOTE ADULT - SUBJECTIVE AND OBJECTIVE BOX
HPI: 63 year old Man with hx of Mitral valve repair 2009 at [facility], HTN, HLD, BPH appendectomy, referred to ED from PCP's office for LLE redness/swelling, pain, fever (101), chills with onset Tuesday. Denies CP/palpitation/SOB/HA/dizziness/abd pain/n/v/d, has abrasion to left heel from ill fitting shoe he recently bought, walks barefooted outdoor his property at times; no recent travels.    : no complaints  on iv ABX      PHYSICAL EXAM:    Daily     Daily     ICU Vital Signs Last 24 Hrs  T(C): 36.9 (2019 11:02), Max: 38.2 (2019 21:33)  T(F): 98.4 (2019 11:02), Max: 100.7 (2019 21:33)  HR: 80 (2019 11:02) (79 - 83)  BP: 139/74 (2019 11:02) (129/70 - 148/81)  BP(mean): --  ABP: --  ABP(mean): --  RR: 18 (2019 11:02) (16 - 18)  SpO2: 97% (2019 11:02) (92% - 99%)      Constitutional: Well appearing  HEENT: Atraumatic, TIERRA, Normal, No congestion  Respiratory: Breath Sounds normal, no rhonchi/wheeze  Cardiovascular: N S1S2;  Gastrointestinal: Abdomen soft, non tender, Bowel Sounds present  Extremities: No edema, peripheral pulses present  Neurological: AAO x 3, no gross focal motor deficits  Skin: Non cellulitic, no rash, ulcers  Lymph Nodes: No lymphadenopathy noted  Back: No CVA tenderness   Musculoskeletal: non tender  Breasts: Deferred  Genitourinary: deferred  Rectal: Deferred                          12.8   7.52  )-----------( 108      ( 2019 08:19 )             38.1       CBC Full  -  ( 2019 08:19 )  WBC Count : 7.52 K/uL  RBC Count : 4.20 M/uL  Hemoglobin : 12.8 g/dL  Hematocrit : 38.1 %  Platelet Count - Automated : 108 K/uL  Mean Cell Volume : 90.7 fl  Mean Cell Hemoglobin : 30.5 pg  Mean Cell Hemoglobin Concentration : 33.6 gm/dL  Auto Neutrophil # : x  Auto Lymphocyte # : x  Auto Monocyte # : x  Auto Eosinophil # : x  Auto Basophil # : x  Auto Neutrophil % : x  Auto Lymphocyte % : x  Auto Monocyte % : x  Auto Eosinophil % : x  Auto Basophil % : x          141  |  107  |  17  ----------------------------<  144<H>  3.9   |  24  |  1.25    Ca    8.5      2019 08:19    TPro  7.5  /  Alb  3.4  /  TBili  1.2  /  DBili  x   /  AST  48<H>  /  ALT  42  /  AlkPhos  83  06-13      LIVER FUNCTIONS - ( 2019 11:11 )  Alb: 3.4 g/dL / Pro: 7.5 gm/dL / ALK PHOS: 83 U/L / ALT: 42 U/L / AST: 48 U/L / GGT: x             PT/INR - ( 2019 11:11 )   PT: 13.7 sec;   INR: 1.23 ratio         PTT - ( 2019 11:11 )  PTT:28.9 sec          Urinalysis Basic - ( 2019 11:11 )    Color: Yellow / Appearance: Clear / S.010 / pH: x  Gluc: x / Ketone: Trace  / Bili: Small / Urobili: 8 mg/dL   Blood: x / Protein: 100 mg/dL / Nitrite: Negative   Leuk Esterase: Trace / RBC: 3-5 /HPF / WBC 3-5   Sq Epi: x / Non Sq Epi: Negative / Bacteria: Few            MEDICATIONS  (STANDING):  atorvastatin 20 milliGRAM(s) Oral daily  ceFAZolin   IVPB 2000 milliGRAM(s) IV Intermittent every 8 hours  clotrimazole 1% Cream 1 Application(s) Topical two times a day  heparin  Injectable 5000 Unit(s) SubCutaneous every 8 hours  mupirocin 2% Ointment 1 Application(s) Topical <User Schedule>  sodium chloride 0.45%. 1000 milliLiter(s) (100 mL/Hr) IV Continuous <Continuous>  tamsulosin 0.4 milliGRAM(s) Oral daily  vancomycin  IVPB 1250 milliGRAM(s) IV Intermittent every 12 hours  vancomycin  IVPB

## 2019-06-14 NOTE — PROGRESS NOTE ADULT - SUBJECTIVE AND OBJECTIVE BOX
Date of Service: 19     S : 63 year old Male initially seen by Podiatry on  at bedside for onychomycosis. Patient stated he was sent in by his PMD for redness and swelling of the left leg which he stated began 1 day ago. Patient related approximately 2 weeks ago he noticed a small wound had developed on his L heel which he believed was caused by wearing new shoes which he stated was too small for his feet. Patient denied any drainage from the wound at any time. Patient reportd he has not seen an outside Podiatrist for treatment. Patient denied any f/n/v/c/sob/cp at this time.    : Pt was seen at bedside for Left heel wound. Pt was resting in bed and in NAD. He denies any pedal complaint. He denies any f/c/n/v/sob.    PMH: HTN (hypertension)  HLD (hyperlipidemia)  HTN (hypertension)  HLD (hyperlipidemia)    PSH: S/P MVR (mitral valve repair)      Allergies:No Known Allergies    Labs:  ( @ 11:11)                      14.6  13.49 )-----------( 129                 42.9    Neutrophils = 11.91 (88.3%)  Lymphocytes = 0.92 (6.8%)  Eosinophils = 0.00 (0.0%)  Basophils = 0.05 (0.4%)  Monocytes = 0.49 (3.6%)  Bands = --%        141  |  107  |  17  ----------------------------<  144<H>  3.9   |  24  |  1.25    Ca    8.5      2019 08:19    TPro  7.5  /  Alb  3.4  /  TBili  1.2  /  DBili  x   /  AST  48<H>  /  ALT  42  /  AlkPhos  83      ( 2019 11:11 )   PT: 13.7 sec;   INR: 1.23 ratio;       PTT:28.9 sec    Urinalysis Basic - ( 2019 11:11 )    Color: Yellow / Appearance: Clear / S.010 / pH: x  Gluc: x / Ketone: Trace  / Bili: Small / Urobili: 8 mg/dL   Blood: x / Protein: 100 mg/dL / Nitrite: Negative   Leuk Esterase: Trace / RBC: 3-5 /HPF / WBC 3-5   Sq Epi: x / Non Sq Epi: Negative / Bacteria: Few        Vital Signs Last 24 Hrs  T(C): 36.7 (2019 05:08), Max: 38.2 (2019 21:33)  T(F): 98.1 (2019 05:08), Max: 100.7 (2019 21:33)  HR: 79 (2019 05:08) (78 - 113)  BP: 148/81 (2019 05:08) (108/70 - 148/81)  BP(mean): --  RR: 17 (2019 05:08) (16 - 19)  SpO2: 99% (2019 05:08) (92% - 99%)    REVIEW OF SYSTEMS:    CONSTITUTIONAL: No weakness, fevers or chills  EYES/ENT: No visual changes;  No vertigo or throat pain   NECK: No pain or stiffness  RESPIRATORY: No cough, wheezing, hemoptysis; No shortness of breath  CARDIOVASCULAR: No chest pain or palpitations  GASTROINTESTINAL: No abdominal or epigastric pain. No nausea, vomiting, or hematemesis; No diarrhea or constipation. No melena or hematochezia.  GENITOURINARY: No dysuria, frequency or hematuria  NEUROLOGICAL: No numbness or weakness  SKIN: Wound to Left heel  All other review of systems is negative unless indicated above    Physical Exam:   Constitutional: NAD, alert;  Derm:    Ulceration medial aspect of L heel, fibrogranular in nature, - hyperkeratotic border, wound size approx. 0.8 cm X 1.1 cm X  0.1 cm in depth, + edema, + riki-wound erythema, - purulence, - fluctuance, - tracking/tunneling, - probe to bone, no active drainage. Erythema noted to the periwound area of the left heel wound as well as to the LLE.  Toenails dystrophic and yellow x 10 with subungual debris. Hyperkeratotic tissue at the plantar aspects of the toe pulps of digits 4+5 b/l.   Vascular: Dorsalis Pedis and Posterior Tibial pulses 1/4 b/l.  Capillary re-fill time less then 3 seconds digits 1-5 bilateral.    Neuro: Protective sensation intact to the level of the digits bilateral.  MSK: Muscle strength 5/5 all major muscle groups bilateral. Mild pain elicited with squeezing of left calf.

## 2019-06-15 LAB
ANION GAP SERPL CALC-SCNC: 10 MMOL/L — SIGNIFICANT CHANGE UP (ref 5–17)
BUN SERPL-MCNC: 15 MG/DL — SIGNIFICANT CHANGE UP (ref 7–23)
CALCIUM SERPL-MCNC: 8.9 MG/DL — SIGNIFICANT CHANGE UP (ref 8.5–10.1)
CHLORIDE SERPL-SCNC: 107 MMOL/L — SIGNIFICANT CHANGE UP (ref 96–108)
CO2 SERPL-SCNC: 24 MMOL/L — SIGNIFICANT CHANGE UP (ref 22–31)
CREAT SERPL-MCNC: 1.08 MG/DL — SIGNIFICANT CHANGE UP (ref 0.5–1.3)
GLUCOSE SERPL-MCNC: 110 MG/DL — HIGH (ref 70–99)
POTASSIUM SERPL-MCNC: 4 MMOL/L — SIGNIFICANT CHANGE UP (ref 3.5–5.3)
POTASSIUM SERPL-SCNC: 4 MMOL/L — SIGNIFICANT CHANGE UP (ref 3.5–5.3)
SODIUM SERPL-SCNC: 141 MMOL/L — SIGNIFICANT CHANGE UP (ref 135–145)
VANCOMYCIN TROUGH SERPL-MCNC: 12.2 UG/ML — SIGNIFICANT CHANGE UP (ref 10–20)

## 2019-06-15 RX ORDER — LOSARTAN POTASSIUM 100 MG/1
50 TABLET, FILM COATED ORAL DAILY
Refills: 0 | Status: DISCONTINUED | OUTPATIENT
Start: 2019-06-15 | End: 2019-06-18

## 2019-06-15 RX ADMIN — Medication 1 APPLICATION(S): at 17:15

## 2019-06-15 RX ADMIN — Medication 1 SPRAY(S): at 21:14

## 2019-06-15 RX ADMIN — TAMSULOSIN HYDROCHLORIDE 0.4 MILLIGRAM(S): 0.4 CAPSULE ORAL at 11:43

## 2019-06-15 RX ADMIN — Medication 100 MILLIGRAM(S): at 21:15

## 2019-06-15 RX ADMIN — Medication 1000 MILLIGRAM(S): at 15:18

## 2019-06-15 RX ADMIN — ATORVASTATIN CALCIUM 20 MILLIGRAM(S): 80 TABLET, FILM COATED ORAL at 11:43

## 2019-06-15 RX ADMIN — Medication 166.67 MILLIGRAM(S): at 06:27

## 2019-06-15 RX ADMIN — Medication 100 MILLIGRAM(S): at 05:10

## 2019-06-15 RX ADMIN — Medication 100 MILLIGRAM(S): at 14:19

## 2019-06-15 RX ADMIN — HEPARIN SODIUM 5000 UNIT(S): 5000 INJECTION INTRAVENOUS; SUBCUTANEOUS at 21:14

## 2019-06-15 RX ADMIN — HEPARIN SODIUM 5000 UNIT(S): 5000 INJECTION INTRAVENOUS; SUBCUTANEOUS at 14:18

## 2019-06-15 RX ADMIN — Medication 1000 MILLIGRAM(S): at 14:20

## 2019-06-15 RX ADMIN — Medication 166.67 MILLIGRAM(S): at 17:14

## 2019-06-15 RX ADMIN — Medication 1 APPLICATION(S): at 05:10

## 2019-06-15 RX ADMIN — HEPARIN SODIUM 5000 UNIT(S): 5000 INJECTION INTRAVENOUS; SUBCUTANEOUS at 05:10

## 2019-06-15 RX ADMIN — MUPIROCIN 1 APPLICATION(S): 20 OINTMENT TOPICAL at 11:37

## 2019-06-15 NOTE — PROGRESS NOTE ADULT - SUBJECTIVE AND OBJECTIVE BOX
HPI: 63 year old Man with hx of Mitral valve repair 2009 at [facility], HTN, HLD, BPH appendectomy, referred to ED from PCP's office for LLE redness/swelling, pain, fever (101), chills with onset Tuesday. Denies CP/palpitation/SOB/HA/dizziness/abd pain/n/v/d, has abrasion to left heel from ill fitting shoe he recently bought, walks barefooted outdoor his property at times; no recent travels.    6/14: no complaints  on iv ABX  6.15: c/o Left leg tenderness        REVIEW OF SYSTEMS:    CONSTITUTIONAL: No weakness, No fevers or chills  ENT: No ear ache, No sorethroat  NECK: No pain, No stiffness  RESPIRATORY: No cough, No wheezing, No hemoptysis; No dyspnea  CARDIOVASCULAR: No chest pain, No palpitations  GASTROINTESTINAL: No abd pain, No nausea, No vomiting, No hematemesis, No diarrhea or constipation. No melena, No hematochezia.  GENITOURINARY: No dysuria, No  hematuria  NEUROLOGICAL: No diplopia, No paresthesia, No motor dysfunction  MUSCULOSKELETAL: No arthralgia, No myalgia  SKIN: + rashe and lesions  on his Left heel  PSYCH: no anxiety, no suicidal ideation    All other review of systems is negative unless indicated above    Vital Signs Last 24 Hrs  T(C): 37 (15 Lit 2019 11:35), Max: 37.3 (14 Jun 2019 21:04)  T(F): 98.6 (15 Lit 2019 11:35), Max: 99.1 (14 Jun 2019 21:04)  HR: 78 (15 Lit 2019 11:35) (75 - 89)  BP: 137/79 (15 Lit 2019 11:35) (129/66 - 138/83)  BP(mean): --  RR: 19 (15 Lit 2019 11:35) (17 - 19)  SpO2: 96% (15 Lit 2019 11:35) (96% - 96%)    PHYSICAL EXAM:    GENERAL: NAD, Well nourished  HEENT:  NC/AT, EOMI, PERRLA, No scleral icterus, Moist mucous membranes  NECK: Supple, No JVD  CNS:  Alert & Oriented X3, Motor Strength 5/5 B/L upper and lower extremities; DTRs 2+ intact   LUNG: Normal Breath sounds, Clear to auscultation bilaterally, No rales, No rhonchi, No wheezing  HEART: RRR; No murmurs, No rubs  ABDOMEN: +BS, ST/ND/NT  GENITOURINARY: Voiding, Bladder not distended  EXTREMITIES:  2+ Peripheral Pulses, No clubbing, No cyanosis, No tibial edema  MUSCULOSKELTAL: Joints normal ROM, No TTP, No effusion  SKIN: Left leg rash, Left heel ulceration  RECTAL: deferred, not indicated  BREAST: deferred                          12.8   7.52  )-----------( 108      ( 14 Jun 2019 08:19 )             38.1     06-15    141  |  107  |  15  ----------------------------<  110<H>  4.0   |  24  |  1.08    Ca    8.9      15 Lit 2019 06:00      Vancomycin levels:   Cultures:     MEDICATIONS  (STANDING):  atorvastatin 20 milliGRAM(s) Oral daily  ceFAZolin   IVPB 2000 milliGRAM(s) IV Intermittent every 8 hours  clotrimazole 1% Cream 1 Application(s) Topical two times a day  heparin  Injectable 5000 Unit(s) SubCutaneous every 8 hours  mupirocin 2% Ointment 1 Application(s) Topical <User Schedule>  sodium chloride 0.45%. 1000 milliLiter(s) (100 mL/Hr) IV Continuous <Continuous>  tamsulosin 0.4 milliGRAM(s) Oral daily  vancomycin  IVPB 1250 milliGRAM(s) IV Intermittent every 12 hours    MEDICATIONS  (PRN):  acetaminophen   Tablet .. 1000 milliGRAM(s) Oral every 6 hours PRN Temp greater or equal to 38C (100.4F), Moderate Pain (4 - 6)  fluticasone propionate 50 MICROgram(s)/spray Nasal Spray 1 Spray(s) Both Nostrils two times a day PRN nasal congestion      all labs reviewed  all imaging reviewed    a/p:      LLE cellulitis / pain likely staph infection w/ cracked plantar areas  hx of recurrent LLE cellulitis   f/u blood culture  ancef and vanco iv day#2 as per ID  LLE ultrasound to r/o DVT; no DVT  ID consult appreciated      CARLOS likely d/t volume depletion  resolved, off fluids    L heel superficial abrasion  local wound care w/ bacitracin    Onychomycosis  podiatry consult  Lamisil oral or topical if oral not formulary    HTN - controlled    Dyslipidemia  con't home dose crestor

## 2019-06-15 NOTE — PROGRESS NOTE ADULT - SUBJECTIVE AND OBJECTIVE BOX
Date of Service: 6/15/19     S : 63 year old Male initially seen by Podiatry on  at bedside for onychomycosis. Patient stated he was sent in by his PMD for redness and swelling of the left leg which he stated began 1 day ago. Patient related approximately 2 weeks ago he noticed a small wound had developed on his L heel which he believed was caused by wearing new shoes which he stated was too small for his feet. Patient denied any drainage from the wound at any time. Patient reportd he has not seen an outside Podiatrist for treatment. Patient denied any f/n/v/c/sob/cp at this time.    : Pt was seen at bedside with Dr. Barr for Left heel wound. Pt was resting in bed and in NAD. He denies any pedal complaint. He denies any f/c/n/v/sob.    PMH: HTN (hypertension)  HLD (hyperlipidemia)  HTN (hypertension)  HLD (hyperlipidemia)    PSH: S/P MVR (mitral valve repair)      Allergies:No Known Allergies    Labs:  ( @ 08:19)                      12.8  7.52 )-----------( 108                 38.1    Neutrophils = -- (--%)  Lymphocytes = -- (--%)  Eosinophils = -- (--%)  Basophils = -- (--%)  Monocytes = -- (--%)  Bands = --%    06-15    141  |  107  |  15  ----------------------------<  110<H>  4.0   |  24  |  1.08    Ca    8.9      15 Lit 2019 06:00    TPro  7.5  /  Alb  3.4  /  TBili  1.2  /  DBili  x   /  AST  48<H>  /  ALT  42  /  AlkPhos  83  -    ( 2019 11:11 )   PT: 13.7 sec;   INR: 1.23 ratio;       PTT:28.9 sec      Urinalysis Basic - ( 2019 11:11 )    Color: Yellow / Appearance: Clear / S.010 / pH: x  Gluc: x / Ketone: Trace  / Bili: Small / Urobili: 8 mg/dL   Blood: x / Protein: 100 mg/dL / Nitrite: Negative   Leuk Esterase: Trace / RBC: 3-5 /HPF / WBC 3-5   Sq Epi: x / Non Sq Epi: Negative / Bacteria: Few          Vital Signs Last 24 Hrs  T(C): 36.9 (15 Lit 2019 05:06), Max: 37.3 (2019 21:04)  T(F): 98.5 (15 Lit 2019 05:06), Max: 99.1 (2019 21:04)  HR: 75 (15 Lit 2019 05:06) (75 - 89)  BP: 138/83 (15 Lit 2019 05:06) (129/66 - 139/74)  BP(mean): --  RR: 17 (15 Ilt 2019 05:06) (17 - 18)  SpO2: 96% (15 Lit 2019 05:06) (96% - 97%)      REVIEW OF SYSTEMS:    CONSTITUTIONAL: No weakness, fevers or chills  EYES/ENT: No visual changes;  No vertigo or throat pain   NECK: No pain or stiffness  RESPIRATORY: No cough, wheezing, hemoptysis; No shortness of breath  CARDIOVASCULAR: No chest pain or palpitations  GASTROINTESTINAL: No abdominal or epigastric pain. No nausea, vomiting, or hematemesis; No diarrhea or constipation. No melena or hematochezia.  GENITOURINARY: No dysuria, frequency or hematuria  NEUROLOGICAL: No numbness or weakness  SKIN: Wound to Left heel  All other review of systems is negative unless indicated above    Physical Exam:   Constitutional: NAD, alert;  Derm:    Ulceration medial aspect of L heel, fibrogranular in nature, - hyperkeratotic border, wound size approx. 0.8 cm X 1.1 cm X  0.1 cm in depth, + edema, + riki-wound erythema, - purulence, - fluctuance, - tracking/tunneling, - probe to bone, no active drainage. Erythema noted to the periwound area of the left heel wound as well as to the LLE.  Toenails dystrophic and yellow x 10 with subungual debris. Hyperkeratotic tissue at the plantar aspects of the toe pulps of digits 4+5 b/l.   Vascular: Dorsalis Pedis and Posterior Tibial pulses 1/4 b/l.  Capillary re-fill time less then 3 seconds digits 1-5 bilateral.    Neuro: Protective sensation intact to the level of the digits bilateral.  MSK: Muscle strength 5/5 all major muscle groups bilateral. Mild pain elicited with squeezing of left calf.

## 2019-06-15 NOTE — CDI QUERY NOTE - NSCDIOTHERTXTBX_GEN_ALL_CORE_HH
Pt. admitted with LLE cellulitis and CARLOS.    ED Provider documented Sepsis - Progress: Will admit for sepsis secondary to cellulitis.    H&P documented: LLE cellulitis / pain likely staph infection w/ cracked plantar areas    SUPPORTING DOCUMENTATION AND/OR CLINICAL EVIDENCE:    WBC 13     C Reactive elevated          Rigors    Blood cultures:    Culture - Blood (collected 06-13-19 @ 11:11)  Source: .Blood None  Preliminary Report (06-14-19 @ 16:01):    No growth to date.    Culture - Blood (collected 06-13-19 @ 11:11)  Source: .Blood None    Preliminary Report (06-14-19 @ 16:01):    No growth to date.      Antibiotics:   ceFAZolin   IVPB   100 mL/Hr IV Intermittent (06-13-19 @ 11:27)    ceFAZolin   IVPB   100 mL/Hr IV Intermittent (06-15-19 @ 05:10)   100 mL/Hr IV Intermittent (06-14-19 @ 21:15)   100 mL/Hr IV Intermittent (06-14-19 @ 14:06)    ceFAZolin  Injectable.   1000 milliGRAM(s) IV Push (06-14-19 @ 06:00)   1000 milliGRAM(s) IV Push (06-13-19 @ 21:01)    vancomycin  IVPB   250 mL/Hr IV Intermittent (06-13-19 @ 12:03)    vancomycin  IVPB   166.67 mL/Hr IV Intermittent (06-14-19 @ 11:33)    vancomycin  IVPB   166.67 mL/Hr IV Intermittent (06-15-19 @ 06:27)   166.67 mL/Hr IV Intermittent (06-14-19 @ 18:01)    lactated ringers Bolus   3600 mL/Hr IV Bolus (06-13-19 @ 11:27)    sodium chloride 0.45%.   100 mL/Hr IV Continuous (06-13-19 @ 17:36)   100 mL/Hr IV Continuous (06-13-19 @ 14:18)      STATUS:  Sepsis ruled in  Sepsis is resolving  Sepsis ruled out  Early Sepsis POA, resolved?    PRESENT ON ADMISSION:  Was sepsis present on admission?  If so, please document.

## 2019-06-16 RX ADMIN — HEPARIN SODIUM 5000 UNIT(S): 5000 INJECTION INTRAVENOUS; SUBCUTANEOUS at 13:36

## 2019-06-16 RX ADMIN — Medication 1 APPLICATION(S): at 05:09

## 2019-06-16 RX ADMIN — Medication 100 MILLIGRAM(S): at 21:07

## 2019-06-16 RX ADMIN — Medication 100 MILLIGRAM(S): at 05:08

## 2019-06-16 RX ADMIN — Medication 1 SPRAY(S): at 21:07

## 2019-06-16 RX ADMIN — HEPARIN SODIUM 5000 UNIT(S): 5000 INJECTION INTRAVENOUS; SUBCUTANEOUS at 05:09

## 2019-06-16 RX ADMIN — Medication 166.67 MILLIGRAM(S): at 17:30

## 2019-06-16 RX ADMIN — Medication 1 APPLICATION(S): at 18:41

## 2019-06-16 RX ADMIN — ATORVASTATIN CALCIUM 20 MILLIGRAM(S): 80 TABLET, FILM COATED ORAL at 11:12

## 2019-06-16 RX ADMIN — LOSARTAN POTASSIUM 50 MILLIGRAM(S): 100 TABLET, FILM COATED ORAL at 06:18

## 2019-06-16 RX ADMIN — TAMSULOSIN HYDROCHLORIDE 0.4 MILLIGRAM(S): 0.4 CAPSULE ORAL at 11:12

## 2019-06-16 RX ADMIN — MUPIROCIN 1 APPLICATION(S): 20 OINTMENT TOPICAL at 11:09

## 2019-06-16 RX ADMIN — Medication 100 MILLIGRAM(S): at 13:36

## 2019-06-16 RX ADMIN — HEPARIN SODIUM 5000 UNIT(S): 5000 INJECTION INTRAVENOUS; SUBCUTANEOUS at 21:07

## 2019-06-16 RX ADMIN — Medication 166.67 MILLIGRAM(S): at 06:18

## 2019-06-16 NOTE — PROGRESS NOTE ADULT - SUBJECTIVE AND OBJECTIVE BOX
HPI: 63 year old Man with hx of Mitral valve repair 2009 at [facility], HTN, HLD, BPH appendectomy, referred to ED from PCP's office for LLE redness/swelling, pain, fever (101), chills with onset Tuesday. Denies CP/palpitation/SOB/HA/dizziness/abd pain/n/v/d, has abrasion to left heel from ill fitting shoe he recently bought, walks barefooted outdoor his property at times; no recent travels.    6/14: no complaints  on iv ABX  6.15: c/o Left leg tenderness  6.16: less leg tenderness, less redness         REVIEW OF SYSTEMS:    CONSTITUTIONAL: No weakness, No fevers or chills  ENT: No ear ache, No sorethroat  NECK: No pain, No stiffness  RESPIRATORY: No cough, No wheezing, No hemoptysis; No dyspnea  CARDIOVASCULAR: No chest pain, No palpitations  GASTROINTESTINAL: No abd pain, No nausea, No vomiting, No hematemesis, No diarrhea or constipation. No melena, No hematochezia.  GENITOURINARY: No dysuria, No  hematuria  NEUROLOGICAL: No diplopia, No paresthesia, No motor dysfunction  MUSCULOSKELETAL: No arthralgia, No myalgia  SKIN: + rashe and lesions  on his Left heel  PSYCH: no anxiety, no suicidal ideation    All other review of systems is negative unless indicated above    Vital Signs Last 24 Hrs  T(C): 36.9 (16 Jun 2019 11:28), Max: 37.1 (16 Jun 2019 04:53)  T(F): 98.5 (16 Jun 2019 11:28), Max: 98.7 (16 Jun 2019 04:53)  HR: 77 (16 Jun 2019 11:28) (77 - 81)  BP: 130/76 (16 Jun 2019 11:28) (126/71 - 147/81)  RR: 18 (16 Jun 2019 11:28) (18 - 18)  SpO2: 95% (16 Jun 2019 11:28) (95% - 95%)    PHYSICAL EXAM:    GENERAL: NAD, Well nourished  HEENT:  NC/AT, EOMI, PERRLA, No scleral icterus, Moist mucous membranes  NECK: Supple, No JVD  CNS:  Alert & Oriented X3, Motor Strength 5/5 B/L upper and lower extremities; DTRs 2+ intact   LUNG: Normal Breath sounds, Clear to auscultation bilaterally, No rales, No rhonchi, No wheezing  HEART: RRR; No murmurs, No rubs  ABDOMEN: +BS, ST/ND/NT  GENITOURINARY: Voiding, Bladder not distended  EXTREMITIES:  2+ Peripheral Pulses, No clubbing, No cyanosis, No tibial edema  MUSCULOSKELTAL: Joints normal ROM, No TTP, No effusion  SKIN: Left leg rash, Left heel ulceration  RECTAL: deferred, not indicated  BREAST: deferred                          12.8   7.52  )-----------( 108      ( 14 Jun 2019 08:19 )             38.1     06-15    141  |  107  |  15  ----------------------------<  110<H>  4.0   |  24  |  1.08    Ca    8.9      15 Lit 2019 06:00      Vancomycin levels:   Cultures:     MEDICATIONS  (STANDING):  atorvastatin 20 milliGRAM(s) Oral daily  ceFAZolin   IVPB 2000 milliGRAM(s) IV Intermittent every 8 hours  clotrimazole 1% Cream 1 Application(s) Topical two times a day  heparin  Injectable 5000 Unit(s) SubCutaneous every 8 hours  mupirocin 2% Ointment 1 Application(s) Topical <User Schedule>  sodium chloride 0.45%. 1000 milliLiter(s) (100 mL/Hr) IV Continuous <Continuous>  tamsulosin 0.4 milliGRAM(s) Oral daily  vancomycin  IVPB 1250 milliGRAM(s) IV Intermittent every 12 hours    MEDICATIONS  (PRN):  acetaminophen   Tablet .. 1000 milliGRAM(s) Oral every 6 hours PRN Temp greater or equal to 38C (100.4F), Moderate Pain (4 - 6)  fluticasone propionate 50 MICROgram(s)/spray Nasal Spray 1 Spray(s) Both Nostrils two times a day PRN nasal congestion      all labs reviewed  all imaging reviewed    a/p:      LLE cellulitis / pain likely staph infection w/ cracked plantar areas  hx of recurrent LLE cellulitis   f/u blood culture  ancef and vanco iv day#3 as per ID  LLE ultrasound to r/o DVT; no DVT  ID consult appreciated  Clotrimazole cream for tinea pedis      CARLOS likely d/t volume depletion  resolved, off fluids    L heel superficial abrasion  local wound care w/ bacitracin    Onychomycosis  podiatry consult  Lamisil oral or topical if oral not formulary    HTN - controlled    Dyslipidemia  con't home dose crestor

## 2019-06-17 RX ADMIN — HEPARIN SODIUM 5000 UNIT(S): 5000 INJECTION INTRAVENOUS; SUBCUTANEOUS at 14:13

## 2019-06-17 RX ADMIN — Medication 1000 MILLIGRAM(S): at 03:06

## 2019-06-17 RX ADMIN — Medication 100 MILLIGRAM(S): at 21:38

## 2019-06-17 RX ADMIN — Medication 1000 MILLIGRAM(S): at 02:08

## 2019-06-17 RX ADMIN — Medication 166.67 MILLIGRAM(S): at 05:51

## 2019-06-17 RX ADMIN — Medication 100 MILLIGRAM(S): at 05:18

## 2019-06-17 RX ADMIN — LOSARTAN POTASSIUM 50 MILLIGRAM(S): 100 TABLET, FILM COATED ORAL at 05:19

## 2019-06-17 RX ADMIN — Medication 1 APPLICATION(S): at 17:15

## 2019-06-17 RX ADMIN — ATORVASTATIN CALCIUM 20 MILLIGRAM(S): 80 TABLET, FILM COATED ORAL at 11:18

## 2019-06-17 RX ADMIN — HEPARIN SODIUM 5000 UNIT(S): 5000 INJECTION INTRAVENOUS; SUBCUTANEOUS at 21:38

## 2019-06-17 RX ADMIN — Medication 1 APPLICATION(S): at 05:19

## 2019-06-17 RX ADMIN — HEPARIN SODIUM 5000 UNIT(S): 5000 INJECTION INTRAVENOUS; SUBCUTANEOUS at 05:19

## 2019-06-17 RX ADMIN — Medication 166.67 MILLIGRAM(S): at 17:18

## 2019-06-17 RX ADMIN — Medication 1 SPRAY(S): at 20:46

## 2019-06-17 RX ADMIN — TAMSULOSIN HYDROCHLORIDE 0.4 MILLIGRAM(S): 0.4 CAPSULE ORAL at 11:18

## 2019-06-17 RX ADMIN — Medication 100 MILLIGRAM(S): at 14:13

## 2019-06-17 RX ADMIN — MUPIROCIN 1 APPLICATION(S): 20 OINTMENT TOPICAL at 11:16

## 2019-06-17 NOTE — PROGRESS NOTE ADULT - ASSESSMENT
Assessment:  Patient is a 62 Y/O Male seen by Podiatry for the followin. Fibrogranular wound to left heel, superficial in nature- stable  2. LLE cellulitis  3. Onychomycosis of toenails 1-10  4. Pain in the b/l feet and toes secondary to onychomycosis -10     P:   Pt seen and evaluated.   Labs and chart reviewed.   Discussed diagnosis and treatment with patient  Bactroban, Allevyn pad applied to Left heel wound.   Please offload heels on pillows.  Clotrimazole cream ordered by Medicine team.   Care by Medicine appreciated.  Ambulate in surgical shoe on Left foot.   Discussed importance of daily foot examinations and proper shoe gear. Patient demonstrated verbal understanding of all interventions and tolerated interventions well without any complications.   Pt should follow up with Dr. Barr in his office 1 week upon hospital discharge.   Podiatry will follow every other day.
63 year old Man with hx of Mitral valve repair 2009 at [facility], HTN, HLD, BPH appendectomy, referred to ED from PCP's office for LLE redness/swelling, pain, fever (101), chills with onset 6/11. Denies CP/palpitation/SOB/HA/dizziness/abd pain/n/v/d, has abrasion to left heel from ill fitting shoe he recently bought, walks barefooted outdoor his property at times; no recent travels. In the ED t (.0 , /70, RR 19, O2 sat 94% rm, lactate 1.0, WBC 13.49, platelet 129, crt 1.47, Ua no WBC, few bacteria, negative nitrile trace LE. He received ancef IVPB 2gm, vanco 1750 IVP x1, tylenol 650mg x1, and LR 2L bolus.     1. LLE cellulitis. toenail onychomycosis  - on vancomycin [id number] trough wnl #4  - on ancef 2gmq8h #4  - continue with abx coverage   - cx no growth  - podiatry eval appreciated  - continue with topical clotrimazole  - monitor temps  - tolerating abx well so far; no side effects noted  - reason for abx use and side effects reviewed with patient  - supportive care  - fu cbc  - on dc plan for po keflex 500q6h to complete abx course    2. other issues - care per medicine
Assessment:  Patient is a 62 Y/O Male seen by Podiatry for the followin. Fibrogranular wound to left heel, superficial in nature  2. LLE cellulitis  3. Onychomycosis of toenails 1-10  4. Pain in the b/l feet and toes secondary to onychomycosis 1-10     P:   Pt seen and evaluated.   Labs and chart reviewed.   Discussed diagnosis and treatment with patient  Venous ultrasound was negative for DVT.   Bactroban, Allevyn pad applied to Left heel wound.   Clotrimazole cream ordered by Medicine team. Nursing order placed for clotrimazole cream to be applied to toenails 1-10 every morning and night.   Care by Medicine appreciated.  Please offload heels on pillows.  Ambulate in surgical shoe on Left foot.   Discussed importance of daily foot examinations and proper shoe gear.  Patient demonstrated verbal understanding of all interventions and tolerated interventions well without any complications.   Podiatry will follow while in house.
Assessment:  Patient is a 62 Y/O Male seen by Podiatry for the followin. Fibrogranular wound to left heel, superficial in nature- stable  2. LLE cellulitis  3. Onychomycosis of toenails 1-10  4. Pain in the b/l feet and toes secondary to onychomycosis -10     P:   Pt seen and evaluated.   Labs and chart reviewed.   Discussed diagnosis and treatment with patient  Bactroban, Allevyn pad applied to Left heel wound.   Please offload heels on pillows.  Clotrimazole cream ordered by Medicine team.   Care by Medicine appreciated.  Ambulate in surgical shoe on Left foot.   Discussed importance of daily foot examinations and proper shoe gear. Patient demonstrated verbal understanding of all interventions and tolerated interventions well without any complications.   Pt should follow up with Dr. Barr in his office 1 week upon hospital discharge.   Podiatry will follow every other day.
LLE cellulitis / pain likely staph infection w/ cracked plantar areas  hx of recurrent LLE cellulitis   f/u blood culture  ancef as per ID  vitals per routine  LLE ultrasound to r/o DVT; no DVT  ck crp / sed rate elevated  ID consult appreciated    Thrombocytopenia ? acute in the setting of infection vs chronic  baseline 166 in 2015  monitor for now  ck CBC n am    CARLOS likely d/t volume depletion  Ua does not indicate UTI  denies dysuria/urgency/ has increase urination HS chronically in setting of BPH  hold home dose losartan today  start  cc/ hr x 24hrs  monitor BMP    L heel superficial abrasion  local wound care w/ bacitracin    Onychomycosis  podiatry consult  Lamisil oral or topical if oral not formulary    HTN - controlled  borderline low  monitor VS per floor routine    Dyslipidemia  con't home dose crestor    DVt PPX: heparin s/q    poc discussed with pt, team

## 2019-06-17 NOTE — PROGRESS NOTE ADULT - SUBJECTIVE AND OBJECTIVE BOX
HPI: 63 year old Man with hx of Mitral valve repair 2009 at [facility], HTN, HLD, BPH appendectomy, referred to ED from PCP's office for LLE redness/swelling, pain, fever (101), chills with onset Tuesday. Denies CP/palpitation/SOB/HA/dizziness/abd pain/n/v/d, has abrasion to left heel from ill fitting shoe he recently bought, walks barefooted outdoor his property at times; no recent travels.    6/14: no complaints  on iv ABX  6.15: c/o Left leg tenderness  6.16: less leg tenderness, less redness   6.17: left leg pain improved, less erythema, less edema        REVIEW OF SYSTEMS:    CONSTITUTIONAL: No weakness, No fevers or chills  ENT: No ear ache, No sorethroat  NECK: No pain, No stiffness  RESPIRATORY: No cough, No wheezing, No hemoptysis; No dyspnea  CARDIOVASCULAR: No chest pain, No palpitations  GASTROINTESTINAL: No abd pain, No nausea, No vomiting, No hematemesis, No diarrhea or constipation. No melena, No hematochezia.  GENITOURINARY: No dysuria, No  hematuria  NEUROLOGICAL: No diplopia, No paresthesia, No motor dysfunction  MUSCULOSKELETAL: No arthralgia, No myalgia  SKIN: + rashe and lesions  on his Left heel  PSYCH: no anxiety, no suicidal ideation    All other review of systems is negative unless indicated above    Vital Signs Last 24 Hrs  T(C): 36.6 (17 Jun 2019 11:00), Max: 36.9 (16 Jun 2019 20:53)  T(F): 97.8 (17 Jun 2019 11:00), Max: 98.5 (16 Jun 2019 20:53)  HR: 80 (17 Jun 2019 11:00) (71 - 83)  BP: 130/66 (17 Jun 2019 11:00) (128/71 - 153/89)  RR: 18 (17 Jun 2019 11:00) (17 - 18)  SpO2: 94% (17 Jun 2019 11:00) (94% - 99%)    PHYSICAL EXAM:    GENERAL: NAD, Well nourished  HEENT:  NC/AT, EOMI, PERRLA, No scleral icterus, Moist mucous membranes  NECK: Supple, No JVD  CNS:  Alert & Oriented X3, Motor Strength 5/5 B/L upper and lower extremities; DTRs 2+ intact   LUNG: Normal Breath sounds, Clear to auscultation bilaterally, No rales, No rhonchi, No wheezing  HEART: RRR; No murmurs, No rubs  ABDOMEN: +BS, ST/ND/NT  GENITOURINARY: Voiding, Bladder not distended  EXTREMITIES:  2+ Peripheral Pulses, No clubbing, No cyanosis, No tibial edema  MUSCULOSKELTAL: Joints normal ROM, No TTP, No effusion  SKIN: Left leg rash, Left heel ulceration  RECTAL: deferred, not indicated  BREAST: deferred                          12.8   7.52  )-----------( 108      ( 14 Jun 2019 08:19 )             38.1     06-15    141  |  107  |  15  ----------------------------<  110<H>  4.0   |  24  |  1.08    Ca    8.9      15 Lit 2019 06:00      Vancomycin levels:   Cultures:     MEDICATIONS  (STANDING):  atorvastatin 20 milliGRAM(s) Oral daily  ceFAZolin   IVPB 2000 milliGRAM(s) IV Intermittent every 8 hours  clotrimazole 1% Cream 1 Application(s) Topical two times a day  heparin  Injectable 5000 Unit(s) SubCutaneous every 8 hours  mupirocin 2% Ointment 1 Application(s) Topical <User Schedule>  sodium chloride 0.45%. 1000 milliLiter(s) (100 mL/Hr) IV Continuous <Continuous>  tamsulosin 0.4 milliGRAM(s) Oral daily  vancomycin  IVPB 1250 milliGRAM(s) IV Intermittent every 12 hours    MEDICATIONS  (PRN):  acetaminophen   Tablet .. 1000 milliGRAM(s) Oral every 6 hours PRN Temp greater or equal to 38C (100.4F), Moderate Pain (4 - 6)  fluticasone propionate 50 MICROgram(s)/spray Nasal Spray 1 Spray(s) Both Nostrils two times a day PRN nasal congestion      all labs reviewed  all imaging reviewed    a/p:      LLE cellulitis / pain likely staph infection w/ cracked plantar areas  hx of recurrent LLE cellulitis   f/u blood culture  ancef and vanco iv day#4/5 as per ID  LLE ultrasound to r/o DVT; no DVT  ID consult appreciated  Clotrimazole cream for tinea pedis      CARLOS likely d/t volume depletion  resolved, off fluids    L heel superficial abrasion  local wound care w/ bacitracin    Onychomycosis  podiatry consult noted  Lamisil topical     HTN - controlled    Dyslipidemia  con't home dose crestor HPI: 63 year old Man with hx of Mitral valve repair 2009 at [facility], HTN, HLD, BPH appendectomy, referred to ED from PCP's office for LLE redness/swelling, pain, fever (101), chills with onset Tuesday. Denies CP/palpitation/SOB/HA/dizziness/abd pain/n/v/d, has abrasion to left heel from ill fitting shoe he recently bought, walks barefooted outdoor his property at times; no recent travels.    6/14: no complaints  on iv ABX  6.15: c/o Left leg tenderness  6.16: less leg tenderness, less redness   6.17: left leg pain improved, less erythema, less edema        REVIEW OF SYSTEMS:    CONSTITUTIONAL: No weakness, No fevers or chills  ENT: No ear ache, No sorethroat  NECK: No pain, No stiffness  RESPIRATORY: No cough, No wheezing, No hemoptysis; No dyspnea  CARDIOVASCULAR: No chest pain, No palpitations  GASTROINTESTINAL: No abd pain, No nausea, No vomiting, No hematemesis, No diarrhea or constipation. No melena, No hematochezia.  GENITOURINARY: No dysuria, No  hematuria  NEUROLOGICAL: No diplopia, No paresthesia, No motor dysfunction  MUSCULOSKELETAL: No arthralgia, No myalgia  SKIN: + rashe and lesions  on his Left heel  PSYCH: no anxiety, no suicidal ideation    All other review of systems is negative unless indicated above    Vital Signs Last 24 Hrs  T(C): 36.6 (17 Jun 2019 11:00), Max: 36.9 (16 Jun 2019 20:53)  T(F): 97.8 (17 Jun 2019 11:00), Max: 98.5 (16 Jun 2019 20:53)  HR: 80 (17 Jun 2019 11:00) (71 - 83)  BP: 130/66 (17 Jun 2019 11:00) (128/71 - 153/89)  RR: 18 (17 Jun 2019 11:00) (17 - 18)  SpO2: 94% (17 Jun 2019 11:00) (94% - 99%)    PHYSICAL EXAM:    GENERAL: NAD, Well nourished  HEENT:  NC/AT, EOMI, PERRLA, No scleral icterus, Moist mucous membranes  NECK: Supple, No JVD  CNS:  Alert & Oriented X3, Motor Strength 5/5 B/L upper and lower extremities; DTRs 2+ intact   LUNG: Normal Breath sounds, Clear to auscultation bilaterally, No rales, No rhonchi, No wheezing  HEART: RRR; No murmurs, No rubs  ABDOMEN: +BS, ST/ND/NT  GENITOURINARY: Voiding, Bladder not distended  EXTREMITIES:  2+ Peripheral Pulses, No clubbing, No cyanosis, No tibial edema  MUSCULOSKELTAL: Joints normal ROM, No TTP, No effusion  SKIN: Left leg rash, Left heel ulceration  RECTAL: deferred, not indicated  BREAST: deferred                          12.8   7.52  )-----------( 108      ( 14 Jun 2019 08:19 )             38.1     06-15    141  |  107  |  15  ----------------------------<  110<H>  4.0   |  24  |  1.08    Ca    8.9      15 Lit 2019 06:00      Vancomycin levels:   Cultures:     MEDICATIONS  (STANDING):  atorvastatin 20 milliGRAM(s) Oral daily  ceFAZolin   IVPB 2000 milliGRAM(s) IV Intermittent every 8 hours  clotrimazole 1% Cream 1 Application(s) Topical two times a day  heparin  Injectable 5000 Unit(s) SubCutaneous every 8 hours  mupirocin 2% Ointment 1 Application(s) Topical <User Schedule>  sodium chloride 0.45%. 1000 milliLiter(s) (100 mL/Hr) IV Continuous <Continuous>  tamsulosin 0.4 milliGRAM(s) Oral daily  vancomycin  IVPB 1250 milliGRAM(s) IV Intermittent every 12 hours    MEDICATIONS  (PRN):  acetaminophen   Tablet .. 1000 milliGRAM(s) Oral every 6 hours PRN Temp greater or equal to 38C (100.4F), Moderate Pain (4 - 6)  fluticasone propionate 50 MICROgram(s)/spray Nasal Spray 1 Spray(s) Both Nostrils two times a day PRN nasal congestion      all labs reviewed  all imaging reviewed    a/p:      Sepsis poa due to LLE cellulitis / pain likely staph infection w/ cracked plantar areas  hx of recurrent LLE cellulitis   f/u blood culture  ancef and vanco iv day#4/5 as per ID, d/c on Keflex 500mg q6hrs x 7days tomorrow  LLE ultrasound to r/o DVT; no DVT  ID consult appreciated  Clotrimazole cream for tinea pedis      CARLSO likely d/t volume depletion  resolved, off fluids    L heel superficial abrasion  local wound care w/ bacitracin    Onychomycosis  podiatry consult noted  Lamisil topical     HTN - controlled    Dyslipidemia  con't home dose crestor

## 2019-06-17 NOTE — PROGRESS NOTE ADULT - SUBJECTIVE AND OBJECTIVE BOX
Date of Service: 19     S : 63 year old Male initially seen by Podiatry on  at bedside for onychomycosis. Patient stated he was sent in by his PMD for redness and swelling of the left leg which he stated began 1 day ago. Patient related approximately 2 weeks ago he noticed a small wound had developed on his L heel which he believed was caused by wearing new shoes which he stated was too small for his feet. Patient denied any drainage from the wound at any time. Patient reportd he has not seen an outside Podiatrist for treatment. Patient denied any f/n/v/c/sob/cp at this time.    : Pt was seen at bedside for evaluation by Podiatry. Pt was resting in bed and in NAD. He denies any pedal complaints at this time. Patient denies any overnight events. He denies any f/c/n/v/sob.    PMH: HTN (hypertension)  HLD (hyperlipidemia)  HTN (hypertension)  HLD (hyperlipidemia)    PSH: S/P MVR (mitral valve repair)      Allergies:No Known Allergies    Labs:  Complete Blood Count in AM (19 @ 08:19)    WBC Count: 7.52 K/uL    RBC Count: 4.20 M/uL    Hemoglobin: 12.8 g/dL    Hematocrit: 38.1 %    Mean Cell Volume: 90.7 fl    Mean Cell Hemoglobin: 30.5 pg    Mean Cell Hemoglobin Conc: 33.6 gm/dL    Red Cell Distrib Width: 12.4 %    Platelet Count - Automated: 108: Smear Reviewed, Result Confirmed. Specimen integrity verified. K/uL    Basic Metabolic Panel in AM (06.15.19 @ 06:00)    Sodium, Serum: 141 mmol/L    Potassium, Serum: 4.0 mmol/L    Chloride, Serum: 107 mmol/L    Carbon Dioxide, Serum: 24 mmol/L    Anion Gap, Serum: 10 mmol/L    Blood Urea Nitrogen, Serum: 15 mg/dL    Creatinine, Serum: 1.08 mg/dL    Glucose, Serum: 110 mg/dL    Calcium, Total Serum: 8.9 mg/dL    eGFR if Non : 73: Interpretative comment  The units for eGFR are mL/min/1.73M2 (normalized body surface area). The  eGFR is calculated from a serum creatinine using the CKD-EPI equation.  Other variables required for calculation are race, age and sex. Among  patients with chronic kidney disease (CKD), the eGFR is useful in  determining the stage of disease according to KDOQI CKD classification.  All eGFR results are reported numerically with the following  interpretation.          GFR                    With                 Without     (ml/min/1.73 m2)    Kidney Damage       Kidney Damage        >= 90                    Stage 1                     Normal        60-89                    Stage 2                     Decreased GFR        30-59     Stage 3                     Stage 3        15-29                    Stage 4                     Stage 4        < 15                      Stage 5                     Stage 5  Each stage of CKD assumes that the associated GFR level has been in  effect for at least 3 months. Determination of stages one and two (with  eGFR > 59 ml/min/m2) requires estimation of kidney damage for at least 3  months as defined by structural or functional abnormalities.  Limitations: All estimates of GFR will be less accurate for patients at  extremes of muscle mass (including but not limited to frail elderly,  critically ill, or cancer patients), those with unusual diets, and those  with conditions associated with reduced secretion or extrarenal  elimination of creatinine. The eGFR equation is not recommended for use  in patients with unstable creatinine levels. mL/min/1.73M2    eGFR if African American: 85 mL/min/1.73M2      Urinalysis Basic - ( 2019 11:11 )    Color: Yellow / Appearance: Clear / S.010 / pH: x  Gluc: x / Ketone: Trace  / Bili: Small / Urobili: 8 mg/dL   Blood: x / Protein: 100 mg/dL / Nitrite: Negative   Leuk Esterase: Trace / RBC: 3-5 /HPF / WBC 3-5   Sq Epi: x / Non Sq Epi: Negative / Bacteria: Few      Vital Signs Last 24 Hrs  T(C): 36.9 (15 Lit 2019 05:06), Max: 37.3 (2019 21:04)  T(F): 98.5 (15 Lit 2019 05:06), Max: 99.1 (2019 21:04)  HR: 75 (15 Lit 2019 05:06) (75 - 89)  BP: 138/83 (15 Lit 2019 05:06) (129/66 - 139/74)  BP(mean): --  RR: 17 (15 Lit 2019 05:06) (17 - 18)  SpO2: 96% (15 Lit 2019 05:06) (96% - 97%)      REVIEW OF SYSTEMS:    CONSTITUTIONAL: No weakness, fevers or chills  EYES/ENT: No visual changes;  No vertigo or throat pain   NECK: No pain or stiffness  RESPIRATORY: No cough, wheezing, hemoptysis; No shortness of breath  CARDIOVASCULAR: No chest pain or palpitations  GASTROINTESTINAL: No abdominal or epigastric pain. No nausea, vomiting, or hematemesis; No diarrhea or constipation. No melena or hematochezia.  GENITOURINARY: No dysuria, frequency or hematuria  NEUROLOGICAL: No numbness or weakness  SKIN: Wound to Left heel  All other review of systems is negative unless indicated above    Physical Exam:   Constitutional: NAD, alert;  Derm:    Ulceration medial aspect of L heel, fibrogranular in nature, - hyperkeratotic border, wound size approx. 0.8 cm X 1.1 cm X  0.1 cm in depth, + edema, + riki-wound erythema, - purulence, - fluctuance, - tracking/tunneling, - probe to bone, no active drainage. Erythema noted to the periwound area of the left heel wound as well as to the LLE, appears to be improving.   Hyperkeratotic tissue at the plantar aspects of the toe pulps of digits 4+5 b/l.   Vascular: Dorsalis Pedis and Posterior Tibial pulses 1/4 b/l.  Capillary re-fill time less then 3 seconds digits 1-5 bilateral.    Neuro: Protective sensation intact to the level of the digits bilateral.  MSK: Muscle strength 5/5 all major muscle groups bilateral. Mild pain elicited with squeezing of left calf.

## 2019-06-17 NOTE — PROGRESS NOTE ADULT - SUBJECTIVE AND OBJECTIVE BOX
Date of service: 19 @ 14:09    pt seen and examined  feels better, LLE less redness/tenderness, still swollen    ROS: no fever or chills; denies dizziness, no HA, no SOB or cough, no abdominal pain, no diarrhea or constipation; no dysuria, no urinary frequency, no legs pain, no rashes    MEDICATIONS  (STANDING):  atorvastatin 20 milliGRAM(s) Oral daily  ceFAZolin   IVPB 2000 milliGRAM(s) IV Intermittent every 8 hours  clotrimazole 1% Cream 1 Application(s) Topical two times a day  heparin  Injectable 5000 Unit(s) SubCutaneous every 8 hours  losartan 50 milliGRAM(s) Oral daily  mupirocin 2% Ointment 1 Application(s) Topical <User Schedule>  tamsulosin 0.4 milliGRAM(s) Oral daily  vancomycin  IVPB 1250 milliGRAM(s) IV Intermittent every 12 hours  vancomycin  IVPB          Vital Signs Last 24 Hrs  T(C): 36.6 (2019 11:00), Max: 36.9 (2019 20:53)  T(F): 97.8 (2019 11:00), Max: 98.5 (2019 20:53)  HR: 80 (2019 11:00) (71 - 83)  BP: 130/66 (2019 11:00) (128/71 - 153/89)  BP(mean): --  RR: 18 (2019 11:00) (17 - 18)  SpO2: 94% (2019 11:00) (94% - 99%)    PE:  Constitutional: frail looking  HEENT: NC/AT, EOMI, PERRLA, conjunctivae clear; ears and nose atraumatic; pharynx benign  Neck: supple; thyroid not palpable  Back: no tenderness  Respiratory: respiratory effort normal; clear to auscultation  Cardiovascular: S1S2 regular, no murmurs  Abdomen: soft, not tender, not distended, positive BS; liver and spleen WNL  Genitourinary: no suprapubic tenderness  Lymphatic: no LN palpable  Musculoskeletal: no muscle tenderness, no joint swelling or tenderness  Extremities: no pedal edema  Neurological/ Psychiatric:  moving all extremities  Skin: LLE redness/swelling, warmth toe nail changes onychomycosis no palpable lesions    Labs: all available labs reviewed               Vancomycin Level, Trough: 12.2 ug/mL (06-15 @ 16:42)      Urinalysis Basic - ( 2019 11:11 )    Color: Yellow / Appearance: Clear / S.010 / pH: x  Gluc: x / Ketone: Trace  / Bili: Small / Urobili: 8 mg/dL   Blood: x / Protein: 100 mg/dL / Nitrite: Negative   Leuk Esterase: Trace / RBC: 3-5 /HPF / WBC 3-5   Sq Epi: x / Non Sq Epi: Negative / Bacteria: Few    Culture - Blood (19 @ 11:11)    Specimen Source: .Blood None    Culture Results:   No growth to date.          Radiology: all available radiological tests reviewed    EXAM:  US DPLX LWR EXT VEINS LTD LT                            PROCEDURE DATE:  2019          INTERPRETATION:  CLINICAL INFORMATION: Left lower extremity swelling   redness and tenderness    COMPARISON: None available.    TECHNIQUE: Duplex sonography of the LEFT LOWER extremity with color and   spectral Doppler, with and without compression.      FINDINGS:    There is normal compressibility of the left common femoral, femoral and   popliteal veins.     The contralateral common femoral vein is patent.    Doppler examination shows normal spontaneous and phasic flow.    No calf vein thrombosis is detected.    IMPRESSION:     No evidence of left lower extremity deep venous thrombosis.      Advanced directives addressed: full resuscitation

## 2019-06-17 NOTE — PROGRESS NOTE ADULT - REASON FOR ADMISSION
LLE cellulits
LLE cellulitis
LLE cellulits
LLE cellulits
LLE cellulitis
LLE cellulits

## 2019-06-17 NOTE — CDI QUERY NOTE - NSCDI_DOCCLARIFY_GEN_ALL_CORE_FT
In responding to this request, please exercise your independent professional judgment.  The fact that a question is asked does not imply that any particular answer is desired or expected.
In responding to this request, please exercise your independent professional judgment.  The fact that a question is asked does not imply that any particular answer is desired or expected.
EENMT

## 2019-06-18 ENCOUNTER — TRANSCRIPTION ENCOUNTER (OUTPATIENT)
Age: 64
End: 2019-06-18

## 2019-06-18 VITALS
DIASTOLIC BLOOD PRESSURE: 73 MMHG | HEART RATE: 76 BPM | OXYGEN SATURATION: 99 % | RESPIRATION RATE: 17 BRPM | TEMPERATURE: 98 F | SYSTOLIC BLOOD PRESSURE: 133 MMHG

## 2019-06-18 LAB
CULTURE RESULTS: SIGNIFICANT CHANGE UP
CULTURE RESULTS: SIGNIFICANT CHANGE UP
SPECIMEN SOURCE: SIGNIFICANT CHANGE UP
SPECIMEN SOURCE: SIGNIFICANT CHANGE UP

## 2019-06-18 RX ORDER — CEPHALEXIN 500 MG
1 CAPSULE ORAL
Qty: 28 | Refills: 0
Start: 2019-06-18 | End: 2019-06-24

## 2019-06-18 RX ADMIN — MUPIROCIN 1 APPLICATION(S): 20 OINTMENT TOPICAL at 06:10

## 2019-06-18 RX ADMIN — ATORVASTATIN CALCIUM 20 MILLIGRAM(S): 80 TABLET, FILM COATED ORAL at 11:18

## 2019-06-18 RX ADMIN — HEPARIN SODIUM 5000 UNIT(S): 5000 INJECTION INTRAVENOUS; SUBCUTANEOUS at 05:26

## 2019-06-18 RX ADMIN — LOSARTAN POTASSIUM 50 MILLIGRAM(S): 100 TABLET, FILM COATED ORAL at 05:26

## 2019-06-18 RX ADMIN — TAMSULOSIN HYDROCHLORIDE 0.4 MILLIGRAM(S): 0.4 CAPSULE ORAL at 11:18

## 2019-06-18 RX ADMIN — Medication 100 MILLIGRAM(S): at 14:05

## 2019-06-18 RX ADMIN — Medication 100 MILLIGRAM(S): at 05:26

## 2019-06-18 RX ADMIN — Medication 1 SPRAY(S): at 06:10

## 2019-06-18 RX ADMIN — Medication 166.67 MILLIGRAM(S): at 06:10

## 2019-06-18 RX ADMIN — Medication 1 APPLICATION(S): at 06:10

## 2019-06-18 NOTE — DISCHARGE NOTE PROVIDER - NSDCFUADDAPPT_GEN_ALL_CORE_FT
PCP- follow up in 1 week. Bring these papers with you to that appointment so your PCP can request records from your hospital stay.    Follow up with Podiatry, Dr Barr in 1 week.     Wear surgical shoe as directed.

## 2019-06-18 NOTE — DISCHARGE NOTE NURSING/CASE MANAGEMENT/SOCIAL WORK - NSDCDPATPORTLINK_GEN_ALL_CORE
You can access the [facility] Portal, offered by [facility], by registering with the following website: [email or web address]

## 2019-06-18 NOTE — PROVIDER CONTACT NOTE (OTHER) - SITUATION
DC Note faxed to Dr Lopez Grewal @ [phone]
called md service spoke with analia
office made aware of che @ [phone]
spoke with podiatry res they will see the pt

## 2019-06-18 NOTE — DISCHARGE NOTE PROVIDER - HOSPITAL COURSE
63 year old Man with hx of Mitral valve repair 2009 at [facility], HTN, HLD, BPH appendectomy, referred to ED from PCP's office for LLE redness/swelling, pain, fever (101), chills with onset 6/11. Denies CP/palpitation/SOB/HA/dizziness/abd pain/n/v/d, has abrasion to left heel from ill fitting shoe he recently bought, walks barefooted outdoor his property at times; no recent travels. In the ED t (.0 , /70, RR 19, O2 sat 94% rm, lactate 1.0, WBC 13.49, platelet 129, crt 1.47, Ua no WBC, few bacteria, negative nitrile trace LE. He received ancef IVPB 2gm, vanco 1750 IVP x1, tylenol 650mg x1, and LR 2L bolus.             Pt was admitted to medicine for further evaluation. He was seen by ID and podiatry throughout his stay. His cellulitis improved and is medically stable for discharge. He is in agreement with the plan.                 Sepsis poa due to LLE cellulitis assocaited with bilateral onychomycosis     -hx of recurrent LLE cellulitis     -blood culture no growth to date    LLE ultrasound to r/o DVT; no DVT    Clotrimazole cream for tinea pedis    ID consult appreciated: ancef and vanco iv day#4/5 as per ID, d/c on Keflex 500mg q6hrs x 7days    podiatry eval appreciated: Fibrogranular wound to left heel, superficial in nature. Bactroban, Allevyn pad applied to Left heel wound. offload heels on pillows. Ambulate in surgical shoe on Left foot.     follow up with Dr. Barr in his office 1 week upon hospital discharge.             CARLOS likely d/t volume depletion on CKD III    resolved, off fluids        L heel superficial abrasion    local wound care w/ bacitracin            HTN - controlled        Dyslipidemia    con't home dose crestor

## 2019-06-19 NOTE — DISCUSSION/SUMMARY
[Follow Up Call to Patient's Family] : follow up call to patient's family [Home] : patient was discharged to home [Follow Up Appt with Provider within 7 days] : follow up appt with provider within 7 days

## 2019-06-25 DIAGNOSIS — A41.9 SEPSIS, UNSPECIFIED ORGANISM: ICD-10-CM

## 2019-06-25 DIAGNOSIS — I10 ESSENTIAL (PRIMARY) HYPERTENSION: ICD-10-CM

## 2019-06-25 DIAGNOSIS — B95.8 UNSPECIFIED STAPHYLOCOCCUS AS THE CAUSE OF DISEASES CLASSIFIED ELSEWHERE: ICD-10-CM

## 2019-06-25 DIAGNOSIS — D69.6 THROMBOCYTOPENIA, UNSPECIFIED: ICD-10-CM

## 2019-06-25 DIAGNOSIS — L03.116 CELLULITIS OF LEFT LOWER LIMB: ICD-10-CM

## 2019-06-25 DIAGNOSIS — B35.1 TINEA UNGUIUM: ICD-10-CM

## 2019-06-25 DIAGNOSIS — N17.9 ACUTE KIDNEY FAILURE, UNSPECIFIED: ICD-10-CM

## 2019-06-25 DIAGNOSIS — N40.0 BENIGN PROSTATIC HYPERPLASIA WITHOUT LOWER URINARY TRACT SYMPTOMS: ICD-10-CM

## 2019-06-25 DIAGNOSIS — E78.5 HYPERLIPIDEMIA, UNSPECIFIED: ICD-10-CM

## 2019-07-25 PROBLEM — I10 ESSENTIAL (PRIMARY) HYPERTENSION: Chronic | Status: ACTIVE | Noted: 2019-06-13

## 2019-07-25 PROBLEM — E78.5 HYPERLIPIDEMIA, UNSPECIFIED: Chronic | Status: ACTIVE | Noted: 2019-06-13

## 2019-08-29 ENCOUNTER — APPOINTMENT (OUTPATIENT)
Dept: CARDIOLOGY | Facility: CLINIC | Age: 64
End: 2019-08-29
Payer: COMMERCIAL

## 2019-08-29 VITALS
WEIGHT: 267 LBS | DIASTOLIC BLOOD PRESSURE: 83 MMHG | SYSTOLIC BLOOD PRESSURE: 123 MMHG | HEIGHT: 75 IN | OXYGEN SATURATION: 95 % | HEART RATE: 89 BPM | BODY MASS INDEX: 33.2 KG/M2

## 2019-08-29 PROCEDURE — 99214 OFFICE O/P EST MOD 30 MIN: CPT | Mod: 25

## 2019-08-29 PROCEDURE — 93000 ELECTROCARDIOGRAM COMPLETE: CPT

## 2019-08-29 NOTE — HISTORY OF PRESENT ILLNESS
[FreeTextEntry1] : Here today in routine cardiac follow up after testing. Pt denies chest pain or shortness of breath. He exercises minimally. He reported cellulitis and was admitted to  for 5 days.

## 2019-08-29 NOTE — PHYSICAL EXAM
[General Appearance - Well Developed] : well developed [Normal Appearance] : normal appearance [Well Groomed] : well groomed [General Appearance - Well Nourished] : well nourished [No Deformities] : no deformities [General Appearance - In No Acute Distress] : no acute distress [Normal Conjunctiva] : the conjunctiva exhibited no abnormalities [] : no respiratory distress [Respiration, Rhythm And Depth] : normal respiratory rhythm and effort [Exaggerated Use Of Accessory Muscles For Inspiration] : no accessory muscle use [Auscultation Breath Sounds / Voice Sounds] : lungs were clear to auscultation bilaterally [Heart Rate And Rhythm] : heart rate and rhythm were normal [Heart Sounds] : normal S1 and S2 [Abdomen Soft] : soft [Abnormal Walk] : normal gait [FreeTextEntry1] : No LE edema  [Skin Color & Pigmentation] : normal skin color and pigmentation [Oriented To Time, Place, And Person] : oriented to person, place, and time

## 2019-11-15 ENCOUNTER — MEDICATION RENEWAL (OUTPATIENT)
Age: 64
End: 2019-11-15

## 2020-01-01 NOTE — ASSESSMENT
[FreeTextEntry1] : Pt sent to ER , cellulitis \par Report given to Lillie triage RN\par Seen and examined by Dr. Sahni \par F/up post discharge 
No

## 2020-03-06 ENCOUNTER — APPOINTMENT (OUTPATIENT)
Dept: INTERNAL MEDICINE | Facility: CLINIC | Age: 65
End: 2020-03-06

## 2020-04-14 ENCOUNTER — APPOINTMENT (OUTPATIENT)
Dept: INTERNAL MEDICINE | Facility: CLINIC | Age: 65
End: 2020-04-14

## 2020-04-22 ENCOUNTER — APPOINTMENT (OUTPATIENT)
Dept: INTERNAL MEDICINE | Facility: CLINIC | Age: 65
End: 2020-04-22

## 2020-06-10 ENCOUNTER — RX RENEWAL (OUTPATIENT)
Age: 65
End: 2020-06-10

## 2020-08-27 ENCOUNTER — APPOINTMENT (OUTPATIENT)
Dept: CARDIOLOGY | Facility: CLINIC | Age: 65
End: 2020-08-27

## 2020-09-25 ENCOUNTER — RX RENEWAL (OUTPATIENT)
Age: 65
End: 2020-09-25

## 2020-10-02 ENCOUNTER — APPOINTMENT (OUTPATIENT)
Dept: CARDIOLOGY | Facility: CLINIC | Age: 65
End: 2020-10-02

## 2020-10-14 ENCOUNTER — RX RENEWAL (OUTPATIENT)
Age: 65
End: 2020-10-14

## 2020-10-26 ENCOUNTER — RX RENEWAL (OUTPATIENT)
Age: 65
End: 2020-10-26

## 2020-10-27 ENCOUNTER — RX RENEWAL (OUTPATIENT)
Age: 65
End: 2020-10-27

## 2020-11-10 ENCOUNTER — APPOINTMENT (OUTPATIENT)
Dept: INTERNAL MEDICINE | Facility: CLINIC | Age: 65
End: 2020-11-10
Payer: MEDICARE

## 2020-11-10 VITALS
HEART RATE: 90 BPM | RESPIRATION RATE: 16 BRPM | SYSTOLIC BLOOD PRESSURE: 116 MMHG | BODY MASS INDEX: 33.12 KG/M2 | OXYGEN SATURATION: 95 % | TEMPERATURE: 97.8 F | WEIGHT: 265 LBS | DIASTOLIC BLOOD PRESSURE: 78 MMHG

## 2020-11-10 PROCEDURE — 99396 PREV VISIT EST AGE 40-64: CPT | Mod: 25

## 2020-11-10 PROCEDURE — 99072 ADDL SUPL MATRL&STAF TM PHE: CPT

## 2020-11-10 NOTE — HEALTH RISK ASSESSMENT
[Yes] : Yes [Employed] : employed [Smoke Detector] : smoke detector [Carbon Monoxide Detector] : carbon monoxide detector [Safety elements used in home] : safety elements used in home [Seat Belt] :  uses seat belt [Sunscreen] : uses sunscreen [] : No [Guns at Home] : no guns at home [ColonoscopyDate] : 01/18 [FreeTextEntry2] :

## 2020-11-10 NOTE — HISTORY OF PRESENT ILLNESS
[de-identified] : The patient comes in today for a wellness evaluation.\par \par Patient states, that at this time he is relatively stable. He has not been seen by myself, in 2 years. He states that he has been compliant with his medications that have been previously prescribed. He has been exercising several times a week. Unfortunately, he has gained weight. He is now at his maximum weight. Recently, he began a diet by cutting back on carbohydrates and he has been consuming.\par \par The patient was seen by his cardiologist one year ago. He underwent a workup including an echocardiogram and carotid duplex studies. Apparently the workup was unremarkable. He also underwent a followup surveillance colonoscopy as well. He denies any change in bowel habits.\par \par At this time the patient denies any acute constitutional symptoms. He did not manifest any symptoms consistent with the corona virus. He now comes in for this assessment.

## 2020-11-10 NOTE — PLAN
[FreeTextEntry1] : 1. Continued medication as outlined above.\par \par 2. The patient is refusing all vaccinations and blood work at this time, as he does not have good medical insurance coverage. He would like to wait until next year when he will have Medicare.\par \par 3. Followup in one year with a wellness evaluation and yearly routine blood work. EKG will be performed. The patient will be eligible for Prevnar at 14. He would like to hold off on the new shingles vaccine until he gets coverage as well.\par \par 4. Diet weight loss and exercise have been stressed to the patient.

## 2020-11-10 NOTE — PHYSICAL EXAM
[Normal Sphincter Tone] : normal sphincter tone [No Mass] : no mass [Stool Occult Blood] : stool negative for occult blood [Coordination Grossly Intact] : coordination grossly intact [Normal Gait] : normal gait [Normal Affect] : the affect was normal [Normal Insight/Judgement] : insight and judgment were intact [de-identified] : the base of the prostate was barely palpable and was smooth [de-identified] : hyperpigmented nevi are noted [General Appearance - Alert] : alert [General Appearance - In No Acute Distress] : in no acute distress [Sclera] : the sclera and conjunctiva were normal [PERRL With Normal Accommodation] : pupils were equal in size, round, and reactive to light [Extraocular Movements] : extraocular movements were intact [Neck Appearance] : the appearance of the neck was normal [Neck Cervical Mass (___cm)] : no neck mass was observed [Jugular Venous Distention Increased] : there was no jugular-venous distention [Auscultation Breath Sounds / Voice Sounds] : lungs were clear to auscultation bilaterally [Heart Rate And Rhythm] : heart rate was normal and rhythm regular [Heart Sounds] : normal S1 and S2 [Heart Sounds Gallop] : no gallops [Murmurs] : no murmurs [Heart Sounds Pericardial Friction Rub] : no pericardial rub [Arterial Pulses Carotid] : carotid pulses were normal with no bruits [Bowel Sounds] : normal bowel sounds [Abdomen Soft] : soft [Abdomen Tenderness] : non-tender [] : no hepato-splenomegaly [FreeTextEntry1] : Centripetal obesity. A small umbilical hernia which is easily reducible, is noted. [Cervical Lymph Nodes Enlarged Posterior Bilaterally] : posterior cervical [Cervical Lymph Nodes Enlarged Anterior Bilaterally] : anterior cervical [Supraclavicular Lymph Nodes Enlarged Bilaterally] : supraclavicular [No CVA Tenderness] : no ~M costovertebral angle tenderness [No Spinal Tenderness] : no spinal tenderness [Nail Clubbing] : no clubbing  or cyanosis of the fingernails

## 2020-12-02 ENCOUNTER — RX RENEWAL (OUTPATIENT)
Age: 65
End: 2020-12-02

## 2021-04-13 NOTE — PATIENT PROFILE ADULT - VISION (WITH CORRECTIVE LENSES IF THE PATIENT USUALLY WEARS THEM):
Normal vision: sees adequately in most situations; can see medication labels, newsprint [Alert] : alert [No Acute Distress] : no acute distress [Normocephalic] : normocephalic [Flat Open Anterior Hematite] : flat open anterior fontanelle [Red Reflex Bilateral] : red reflex bilateral [PERRL] : PERRL [Normally Placed Ears] : normally placed ears [Auricles Well Formed] : auricles well formed [Clear Tympanic membranes with present light reflex and bony landmarks] : clear tympanic membranes with present light reflex and bony landmarks [No Discharge] : no discharge [Nares Patent] : nares patent [Palate Intact] : palate intact [Uvula Midline] : uvula midline [Tooth Eruption] : tooth eruption  [Supple, full passive range of motion] : supple, full passive range of motion [No Palpable Masses] : no palpable masses [Symmetric Chest Rise] : symmetric chest rise [Clear to Auscultation Bilaterally] : clear to auscultation bilaterally [Regular Rate and Rhythm] : regular rate and rhythm [S1, S2 present] : S1, S2 present [No Murmurs] : no murmurs [+2 Femoral Pulses] : +2 femoral pulses [Soft] : soft [NonTender] : non tender [Non Distended] : non distended [Normoactive Bowel Sounds] : normoactive bowel sounds [No Hepatomegaly] : no hepatomegaly [No Splenomegaly] : no splenomegaly [Eduardo 1] : Eduardo 1 [Central Urethral Opening] : central urethral opening [Patent] : patent [Testicles Descended Bilaterally] : testicles descended bilaterally [Normally Placed] : normally placed [No Abnormal Lymph Nodes Palpated] : no abnormal lymph nodes palpated [No Clavicular Crepitus] : no clavicular crepitus [Negative Gómez-Ortalani] : negative Gómez-Ortalani [Symmetric Buttocks Creases] : symmetric buttocks creases [No Spinal Dimple] : no spinal dimple [NoTuft of Hair] : no tuft of hair [Plantar Grasp] : plantar grasp [Cranial Nerves Grossly Intact] : cranial nerves grossly intact [No Rash or Lesions] : no rash or lesions

## 2021-06-10 ENCOUNTER — APPOINTMENT (OUTPATIENT)
Dept: CARDIOLOGY | Facility: CLINIC | Age: 66
End: 2021-06-10
Payer: COMMERCIAL

## 2021-06-10 ENCOUNTER — NON-APPOINTMENT (OUTPATIENT)
Age: 66
End: 2021-06-10

## 2021-06-10 VITALS
SYSTOLIC BLOOD PRESSURE: 117 MMHG | DIASTOLIC BLOOD PRESSURE: 79 MMHG | HEART RATE: 100 BPM | OXYGEN SATURATION: 96 % | HEIGHT: 75 IN | BODY MASS INDEX: 32.33 KG/M2 | WEIGHT: 260 LBS

## 2021-06-10 DIAGNOSIS — F32.9 MAJOR DEPRESSIVE DISORDER, SINGLE EPISODE, UNSPECIFIED: ICD-10-CM

## 2021-06-10 DIAGNOSIS — I34.1 NONRHEUMATIC MITRAL (VALVE) PROLAPSE: ICD-10-CM

## 2021-06-10 PROCEDURE — 99214 OFFICE O/P EST MOD 30 MIN: CPT

## 2021-06-10 PROCEDURE — 99072 ADDL SUPL MATRL&STAF TM PHE: CPT

## 2021-06-10 NOTE — HISTORY OF PRESENT ILLNESS
[FreeTextEntry1] : Here today in routine cardiac follow up status post annuloplasty ring, mitral. Pt denies chest pain or shortness of breath. Pt has difficulty losing weight. Pt has followed up with his PMD.

## 2021-06-10 NOTE — REASON FOR VISIT
[Arrhythmia/ECG Abnorrmalities] : arrhythmia/ECG abnormalities [Structural Heart and Valve Disease] : structural heart and valve disease [Follow-Up - Clinic] : a clinic follow-up of [Hyperlipidemia] : hyperlipidemia [Hypertension] : hypertension [Medication Management] : Medication management [FreeTextEntry1] : MV repair

## 2022-01-27 DIAGNOSIS — I77.810 THORACIC AORTIC ECTASIA: ICD-10-CM

## 2022-01-27 DIAGNOSIS — N40.0 BENIGN PROSTATIC HYPERPLASIA WITHOUT LOWER URINARY TRACT SYMPMS: ICD-10-CM

## 2022-03-09 ENCOUNTER — EMERGENCY (EMERGENCY)
Facility: HOSPITAL | Age: 67
LOS: 0 days | Discharge: HOME | End: 2022-03-09
Attending: EMERGENCY MEDICINE
Payer: MEDICARE

## 2022-03-09 VITALS
OXYGEN SATURATION: 100 % | DIASTOLIC BLOOD PRESSURE: 100 MMHG | SYSTOLIC BLOOD PRESSURE: 160 MMHG | RESPIRATION RATE: 18 BRPM | HEART RATE: 83 BPM | TEMPERATURE: 98 F

## 2022-03-09 VITALS — HEIGHT: 75 IN | WEIGHT: 259.93 LBS

## 2022-03-09 DIAGNOSIS — E78.5 HYPERLIPIDEMIA, UNSPECIFIED: ICD-10-CM

## 2022-03-09 DIAGNOSIS — R07.89 OTHER CHEST PAIN: ICD-10-CM

## 2022-03-09 DIAGNOSIS — R05.1 ACUTE COUGH: ICD-10-CM

## 2022-03-09 DIAGNOSIS — I10 ESSENTIAL (PRIMARY) HYPERTENSION: ICD-10-CM

## 2022-03-09 DIAGNOSIS — Z98.890 OTHER SPECIFIED POSTPROCEDURAL STATES: ICD-10-CM

## 2022-03-09 DIAGNOSIS — Z20.822 CONTACT WITH AND (SUSPECTED) EXPOSURE TO COVID-19: ICD-10-CM

## 2022-03-09 DIAGNOSIS — Z98.890 OTHER SPECIFIED POSTPROCEDURAL STATES: Chronic | ICD-10-CM

## 2022-03-09 LAB
ALBUMIN SERPL ELPH-MCNC: 4 G/DL — SIGNIFICANT CHANGE UP (ref 3.3–5)
ALP SERPL-CCNC: 88 U/L — SIGNIFICANT CHANGE UP (ref 40–120)
ALT FLD-CCNC: 30 U/L — SIGNIFICANT CHANGE UP (ref 12–78)
ANION GAP SERPL CALC-SCNC: 4 MMOL/L — LOW (ref 5–17)
AST SERPL-CCNC: 30 U/L — SIGNIFICANT CHANGE UP (ref 15–37)
BASOPHILS # BLD AUTO: 0.06 K/UL — SIGNIFICANT CHANGE UP (ref 0–0.2)
BASOPHILS NFR BLD AUTO: 0.9 % — SIGNIFICANT CHANGE UP (ref 0–2)
BILIRUB SERPL-MCNC: 0.6 MG/DL — SIGNIFICANT CHANGE UP (ref 0.2–1.2)
BUN SERPL-MCNC: 22 MG/DL — SIGNIFICANT CHANGE UP (ref 7–23)
CALCIUM SERPL-MCNC: 9.5 MG/DL — SIGNIFICANT CHANGE UP (ref 8.5–10.1)
CHLORIDE SERPL-SCNC: 108 MMOL/L — SIGNIFICANT CHANGE UP (ref 96–108)
CO2 SERPL-SCNC: 27 MMOL/L — SIGNIFICANT CHANGE UP (ref 22–31)
CREAT SERPL-MCNC: 1.14 MG/DL — SIGNIFICANT CHANGE UP (ref 0.5–1.3)
EGFR: 71 ML/MIN/1.73M2 — SIGNIFICANT CHANGE UP
EOSINOPHIL # BLD AUTO: 0.26 K/UL — SIGNIFICANT CHANGE UP (ref 0–0.5)
EOSINOPHIL NFR BLD AUTO: 4.1 % — SIGNIFICANT CHANGE UP (ref 0–6)
FLUAV AG NPH QL: SIGNIFICANT CHANGE UP
FLUBV AG NPH QL: SIGNIFICANT CHANGE UP
GLUCOSE SERPL-MCNC: 100 MG/DL — HIGH (ref 70–99)
HCT VFR BLD CALC: 43 % — SIGNIFICANT CHANGE UP (ref 39–50)
HGB BLD-MCNC: 14.6 G/DL — SIGNIFICANT CHANGE UP (ref 13–17)
IMM GRANULOCYTES NFR BLD AUTO: 0.3 % — SIGNIFICANT CHANGE UP (ref 0–1.5)
LYMPHOCYTES # BLD AUTO: 0.98 K/UL — LOW (ref 1–3.3)
LYMPHOCYTES # BLD AUTO: 15.4 % — SIGNIFICANT CHANGE UP (ref 13–44)
MCHC RBC-ENTMCNC: 30.2 PG — SIGNIFICANT CHANGE UP (ref 27–34)
MCHC RBC-ENTMCNC: 34 GM/DL — SIGNIFICANT CHANGE UP (ref 32–36)
MCV RBC AUTO: 89 FL — SIGNIFICANT CHANGE UP (ref 80–100)
MONOCYTES # BLD AUTO: 0.43 K/UL — SIGNIFICANT CHANGE UP (ref 0–0.9)
MONOCYTES NFR BLD AUTO: 6.8 % — SIGNIFICANT CHANGE UP (ref 2–14)
NEUTROPHILS # BLD AUTO: 4.6 K/UL — SIGNIFICANT CHANGE UP (ref 1.8–7.4)
NEUTROPHILS NFR BLD AUTO: 72.5 % — SIGNIFICANT CHANGE UP (ref 43–77)
PLATELET # BLD AUTO: 175 K/UL — SIGNIFICANT CHANGE UP (ref 150–400)
POTASSIUM SERPL-MCNC: 4.5 MMOL/L — SIGNIFICANT CHANGE UP (ref 3.5–5.3)
POTASSIUM SERPL-SCNC: 4.5 MMOL/L — SIGNIFICANT CHANGE UP (ref 3.5–5.3)
PROT SERPL-MCNC: 7.6 GM/DL — SIGNIFICANT CHANGE UP (ref 6–8.3)
RBC # BLD: 4.83 M/UL — SIGNIFICANT CHANGE UP (ref 4.2–5.8)
RBC # FLD: 12.2 % — SIGNIFICANT CHANGE UP (ref 10.3–14.5)
RSV RNA NPH QL NAA+NON-PROBE: SIGNIFICANT CHANGE UP
SARS-COV-2 RNA SPEC QL NAA+PROBE: SIGNIFICANT CHANGE UP
SODIUM SERPL-SCNC: 139 MMOL/L — SIGNIFICANT CHANGE UP (ref 135–145)
TROPONIN I, HIGH SENSITIVITY RESULT: 8.59 NG/L — SIGNIFICANT CHANGE UP
WBC # BLD: 6.35 K/UL — SIGNIFICANT CHANGE UP (ref 3.8–10.5)
WBC # FLD AUTO: 6.35 K/UL — SIGNIFICANT CHANGE UP (ref 3.8–10.5)

## 2022-03-09 PROCEDURE — 36415 COLL VENOUS BLD VENIPUNCTURE: CPT

## 2022-03-09 PROCEDURE — 96374 THER/PROPH/DIAG INJ IV PUSH: CPT

## 2022-03-09 PROCEDURE — 80053 COMPREHEN METABOLIC PANEL: CPT

## 2022-03-09 PROCEDURE — 71046 X-RAY EXAM CHEST 2 VIEWS: CPT | Mod: 26

## 2022-03-09 PROCEDURE — 85025 COMPLETE CBC W/AUTO DIFF WBC: CPT

## 2022-03-09 PROCEDURE — 0241U: CPT

## 2022-03-09 PROCEDURE — 93005 ELECTROCARDIOGRAM TRACING: CPT

## 2022-03-09 PROCEDURE — 93010 ELECTROCARDIOGRAM REPORT: CPT

## 2022-03-09 PROCEDURE — 99285 EMERGENCY DEPT VISIT HI MDM: CPT

## 2022-03-09 PROCEDURE — 99285 EMERGENCY DEPT VISIT HI MDM: CPT | Mod: 25

## 2022-03-09 PROCEDURE — 71046 X-RAY EXAM CHEST 2 VIEWS: CPT

## 2022-03-09 PROCEDURE — 84484 ASSAY OF TROPONIN QUANT: CPT

## 2022-03-09 RX ORDER — FAMOTIDINE 10 MG/ML
20 INJECTION INTRAVENOUS ONCE
Refills: 0 | Status: COMPLETED | OUTPATIENT
Start: 2022-03-09 | End: 2022-03-09

## 2022-03-09 RX ORDER — ONDANSETRON 8 MG/1
4 TABLET, FILM COATED ORAL ONCE
Refills: 0 | Status: COMPLETED | OUTPATIENT
Start: 2022-03-09 | End: 2022-03-09

## 2022-03-09 RX ADMIN — Medication 30 MILLILITER(S): at 16:10

## 2022-03-09 RX ADMIN — FAMOTIDINE 20 MILLIGRAM(S): 10 INJECTION INTRAVENOUS at 16:10

## 2022-03-09 NOTE — ED STATDOCS - NS_EDPROVIDERDISPOUSERTYPE_ED_A_ED
[FreeTextEntry1] : 68 yo with low risk, low volume prostate cancer\par \par MRI 12/2020\par IMPRESSION:\par \par 1. No suspicious prostatic lesion.\par \par 2. Nonspecific geographic areas of linear and wedge-shaped signal abnormality within the peripheral zone, without corresponding diffusion or perfusion abnormality, possibly reflecting sequela of chronic prostatitis and/or fibrosis. PI-RADS 2.\par \par 3. Mild BPH.\par \par MRI 7/2019 - \par Volume 55 grams\par PIRADS 2\par \par \par PSA - 4.21 07/01/2020\par PSA - 3.91 01/23/202\par PSA - 4.28   7/11/2019\par PSA - 4.12   4/8/2019\par \par - PSA today\par - f/u in 6 months with repeat PSA \par \par  Scribe Attestation (For Scribes USE Only)... Attending Attestation (For Attendings USE Only).../Scribe Attestation (For Scribes USE Only)...

## 2022-03-09 NOTE — ED STATDOCS - PROGRESS NOTE DETAILS
Patient seen and evaluated, ED attending note and orders reviewed, will continue with patient follow up and care -Erica Goldsmith PA-C labs WNL, trop negative, CXR with NAD, pt feeling better after meds, pt has PMD f/u scheduled on Friday and cardio f/u this month, advised pt to call cardiologist office for a sooner appt.  Will d/c home with strict return precautions pt agreeable to d/c and plan of care  Erica Goldsmith PA-C

## 2022-03-09 NOTE — ED ADULT TRIAGE NOTE - CHIEF COMPLAINT QUOTE
Pt presents to er with complaints of non-radiating mid-sternal chest pressure since Monday evening, denies taking blood thinners prior to arrival, pt states in 2009 pt had mitral valve repair in [facility].

## 2022-03-09 NOTE — ED STATDOCS - CLINICAL SUMMARY MEDICAL DECISION MAKING FREE TEXT BOX
Will evaluate for ACS though story less suspicious. Will treat with GI cocktail. Dispo pending labs, imaging and reassessment. Will evaluate for ACS though story less suspicious. Not suspicious for PE - satting well with normal HR and no sob - below threshold for further w/u.  NO e/o ptx/pna/carditis.  Story not suggesive of dissection - below threshold for further w/u.  Will treat with GI cocktail. Dispo pending labs, imaging and reassessment.

## 2022-03-09 NOTE — ED ADULT NURSE NOTE - OBJECTIVE STATEMENT
Pt presents to ER c/o chest pain. onset of symptoms began 2 days PTA. Pt reports pain is mostly on left side of chest and worse at night. Denies SOB. Pt reports non productive cough x one week. AO x 3 oriented to baseline, normal breathing pattern with no difficulty.

## 2022-03-09 NOTE — ED STATDOCS - CARE PROVIDER_API CALL
Karan Mariee (MD)  Cardiovascular Disease  [address], [address]  [place] [address]  Phone: [phone]  Fax: [phone]  Follow Up Time:

## 2022-03-09 NOTE — ED STATDOCS - NSICDXPASTMEDICALHX_GEN_ALL_CORE_FT
PAST MEDICAL HISTORY:  HLD (hyperlipidemia)     HLD (hyperlipidemia)     HTN (hypertension)     HTN (hypertension)

## 2022-03-09 NOTE — ED STATDOCS - PATIENT PORTAL LINK FT
You can access the FollowMyHealth Patient Portal offered by [facility] by registering at the following website: [email or web address]. By joining [facility]’s FollowMyHealth portal, you will also be able to view your health information using other applications (apps) compatible with our system.

## 2022-03-09 NOTE — ED ADULT NURSE NOTE - CHIEF COMPLAINT QUOTE
Pt presents to er with complaints of non-radiating mid-sternal chest pressure since Monday evening, denies taking blood thinners prior to arrival, pt states in 2009 pt had mitral valve repair in [facility].

## 2022-03-11 ENCOUNTER — APPOINTMENT (OUTPATIENT)
Dept: INTERNAL MEDICINE | Facility: CLINIC | Age: 67
End: 2022-03-11
Payer: MEDICARE

## 2022-03-11 ENCOUNTER — APPOINTMENT (OUTPATIENT)
Dept: CARDIOLOGY | Facility: CLINIC | Age: 67
End: 2022-03-11
Payer: MEDICARE

## 2022-03-11 ENCOUNTER — NON-APPOINTMENT (OUTPATIENT)
Age: 67
End: 2022-03-11

## 2022-03-11 VITALS
HEART RATE: 79 BPM | HEIGHT: 75 IN | RESPIRATION RATE: 16 BRPM | BODY MASS INDEX: 32.33 KG/M2 | DIASTOLIC BLOOD PRESSURE: 80 MMHG | OXYGEN SATURATION: 95 % | TEMPERATURE: 97.6 F | WEIGHT: 260 LBS | SYSTOLIC BLOOD PRESSURE: 120 MMHG

## 2022-03-11 VITALS
TEMPERATURE: 98.2 F | OXYGEN SATURATION: 96 % | SYSTOLIC BLOOD PRESSURE: 120 MMHG | DIASTOLIC BLOOD PRESSURE: 70 MMHG | BODY MASS INDEX: 32.58 KG/M2 | WEIGHT: 262 LBS | HEART RATE: 88 BPM | HEIGHT: 75 IN | RESPIRATION RATE: 16 BRPM

## 2022-03-11 DIAGNOSIS — R07.9 CHEST PAIN, UNSPECIFIED: ICD-10-CM

## 2022-03-11 DIAGNOSIS — U07.1 COVID-19: ICD-10-CM

## 2022-03-11 DIAGNOSIS — R05.9 COUGH, UNSPECIFIED: ICD-10-CM

## 2022-03-11 PROCEDURE — 99214 OFFICE O/P EST MOD 30 MIN: CPT

## 2022-03-11 PROCEDURE — 93306 TTE W/DOPPLER COMPLETE: CPT

## 2022-03-11 PROCEDURE — 93000 ELECTROCARDIOGRAM COMPLETE: CPT

## 2022-03-11 NOTE — PLAN
[FreeTextEntry1] : 1. Continued medication as outlined above.\par \par 2. Await the results of the cardiac workup which is being performed at this time including an echocardiogram and a stress test.\par \par 3. Will now increase the rosuvastatin 10 mg daily to attempt to get the LDL cholesterol to goal.\par \par 4. A cardiovascular exercise regimen has been recommended\par \par 5. Follow up with myself in 6 months with full pulmonary function testing and Prevnar 20 vaccination. He will undergo lipid liver profile and CPK isoenzymes, several days prior to that visit, to assess his response to the increase in dosage of the Crestor from 5 mg to 10 mg.

## 2022-03-11 NOTE — REASON FOR VISIT
[Arrhythmia/ECG Abnorrmalities] : arrhythmia/ECG abnormalities [Follow-Up - Clinic] : a clinic follow-up of [Hyperlipidemia] : hyperlipidemia [Hypertension] : hypertension [Medication Management] : Medication management [FreeTextEntry1] : MV repair  [Symptom and Test Evaluation] : symptom and test evaluation [Structural Heart and Valve Disease] : structural heart and valve disease

## 2022-03-11 NOTE — HISTORY OF PRESENT ILLNESS
[FreeTextEntry1] : 65 yo male presents for evaluation of chest pain which began recently on Monday and recurred Tuesday night while at rest, intermittently. Pt denies other radiation other than left and central chest. No diaphoresis. He was discharged from the ED. History of mitral annuloplasty ring 2009. No history of CAD. No HO MI or CHF. H/O HTN, HLD. Echo was ordered during the summer but not performed.

## 2022-03-11 NOTE — DISCUSSION/SUMMARY
[FreeTextEntry1] : Pt will have an Echo. His symptoms are non specific. ECG reviewed. Labs from  reviewed. He will have an Echo and ETT. He will follow up in 2 months.

## 2022-03-11 NOTE — PHYSICAL EXAM
[Soft] : abdomen soft [Normal Bowel Sounds] : normal bowel sounds [No CVA Tenderness] : no CVA  tenderness [No Rash] : no rash [Normal Affect] : the affect was normal [Normal Insight/Judgement] : insight and judgment were intact [General Appearance - Alert] : alert [General Appearance - In No Acute Distress] : in no acute distress [FreeTextEntry1] : The pharynx is narrowed posteriorly with a Mallampati score of 3-4 [Neck Appearance] : the appearance of the neck was normal [Neck Cervical Mass (___cm)] : no neck mass was observed [Jugular Venous Distention Increased] : there was no jugular-venous distention [] : no respiratory distress [Auscultation Breath Sounds / Voice Sounds] : lungs were clear to auscultation bilaterally [Heart Rate And Rhythm] : heart rate was normal and rhythm regular [Heart Sounds] : normal S1 and S2 [Heart Sounds Gallop] : no gallops [Murmurs] : no murmurs [Heart Sounds Pericardial Friction Rub] : no pericardial rub [Arterial Pulses Carotid] : carotid pulses were normal with no bruits [Edema] : there was no peripheral edema

## 2022-03-11 NOTE — HISTORY OF PRESENT ILLNESS
[FreeTextEntry1] : 67 yo male presents for evaluation of chest pain which began recently on Monday and recurred Tuesday night while at rest, intermittently. Pt denies other radiation other than left and central chest. No diaphoresis. He was discharged from the ED. History of mitral annuloplasty ring 2009. No history of CAD. No HO MI or CHF. H/O HTN, HLD. Echo was ordered during the summer but not performed.

## 2022-03-11 NOTE — HISTORY OF PRESENT ILLNESS
[FreeTextEntry1] : The patient comes in today for a hospital followup evaluation and re-assessment.\par  [de-identified] : The patient, has not been seen in this office, since November of 2020. He states, he was overall doing relatively well. He had been compliant with his medications. \par \par The patient, noted the onset of an episode of chest pain Monday evening. It appeared to wax and wane. On the following day, he again had pain which was located anteriorly across his chest. It radiated to the upper chest on the left. He described it as sharp, but occasionally it was pressure-like in sensation. He became very anxious. The symptoms continued intermittently through the following day which was 3/9/11. At that point, he went into the emergency room for evaluation. Upon arriving in the ER his blood pressure was noted to be elevated at 166/100. Cardiac enzymes were negative. He was sent home, and told to see his cardiologist. He was evaluated earlier this morning, by Dr. Mariee. \par \par At this time, the patient is now asymptomatic. He has been compliant with his losartan. He states that he is scheduled to undergo both an echocardiogram and a stress test later in the day.\par \par The patient did contract and the corona virus back in January of this year. His symptoms include rhinitis and nasal congestion. He had a slight cough. He went to an urgent care center where he tested positive. He was not placed on any specific medications. The patient noted that the cough persisted on an intermittent basis for approximately one month. The cough is now near totally resolved. He is not producing any sputum. He comes in for this assessment.

## 2022-03-11 NOTE — PHYSICAL EXAM
[General Appearance - Well Developed] : well developed [Normal Appearance] : normal appearance [Well Groomed] : well groomed [General Appearance - Well Nourished] : well nourished [No Deformities] : no deformities [General Appearance - In No Acute Distress] : no acute distress [] : no respiratory distress [Respiration, Rhythm And Depth] : normal respiratory rhythm and effort [Exaggerated Use Of Accessory Muscles For Inspiration] : no accessory muscle use [Auscultation Breath Sounds / Voice Sounds] : lungs were clear to auscultation bilaterally [Heart Rate And Rhythm] : heart rate and rhythm were normal [Heart Sounds] : normal S1 and S2 [Abdomen Soft] : soft [Abnormal Walk] : normal gait [FreeTextEntry1] : No LE edema  [Skin Color & Pigmentation] : normal skin color and pigmentation [Oriented To Time, Place, And Person] : oriented to person, place, and time [Well Developed] : well developed [Well Nourished] : well nourished [No Acute Distress] : no acute distress [Normal Conjunctiva] : normal conjunctiva [Normal Venous Pressure] : normal venous pressure [No Carotid Bruit] : no carotid bruit [Normal S1, S2] : normal S1, S2 [No Murmur] : no murmur [No Rub] : no rub [No Gallop] : no gallop [Clear Lung Fields] : clear lung fields [Good Air Entry] : good air entry [No Respiratory Distress] : no respiratory distress  [Soft] : abdomen soft [Non Tender] : non-tender [No Masses/organomegaly] : no masses/organomegaly [Normal Bowel Sounds] : normal bowel sounds [Normal Gait] : normal gait [No Edema] : no edema [No Cyanosis] : no cyanosis [No Clubbing] : no clubbing [No Varicosities] : no varicosities [No Rash] : no rash [No Skin Lesions] : no skin lesions [Moves all extremities] : moves all extremities [No Focal Deficits] : no focal deficits [Normal Speech] : normal speech [Alert and Oriented] : alert and oriented [Normal memory] : normal memory

## 2022-03-16 ENCOUNTER — APPOINTMENT (OUTPATIENT)
Dept: CARDIOLOGY | Facility: CLINIC | Age: 67
End: 2022-03-16
Payer: MEDICARE

## 2022-03-16 PROCEDURE — 93015 CV STRESS TEST SUPVJ I&R: CPT

## 2022-03-25 ENCOUNTER — APPOINTMENT (OUTPATIENT)
Dept: CARDIOLOGY | Facility: CLINIC | Age: 67
End: 2022-03-25
Payer: MEDICARE

## 2022-03-25 ENCOUNTER — NON-APPOINTMENT (OUTPATIENT)
Age: 67
End: 2022-03-25

## 2022-03-25 VITALS
RESPIRATION RATE: 16 BRPM | HEIGHT: 75 IN | DIASTOLIC BLOOD PRESSURE: 69 MMHG | BODY MASS INDEX: 33.07 KG/M2 | SYSTOLIC BLOOD PRESSURE: 120 MMHG | OXYGEN SATURATION: 95 % | HEART RATE: 82 BPM | WEIGHT: 266 LBS

## 2022-03-25 PROCEDURE — 93000 ELECTROCARDIOGRAM COMPLETE: CPT

## 2022-03-25 PROCEDURE — 99214 OFFICE O/P EST MOD 30 MIN: CPT

## 2022-03-25 NOTE — HISTORY OF PRESENT ILLNESS
[FreeTextEntry1] : 65 yo male presents for evaluation of chest pain which resolved. Pt is currently asymptomatic. Pt is being followed for MV disease, HLD, HTN. Testing was performed and the patient exerting himself without difficulty. Pt reports occasional "dizzy spells" which he has had since his mitral valve surgery. No syncope.

## 2022-03-25 NOTE — DISCUSSION/SUMMARY
[FreeTextEntry1] : Pt has resolved symptoms. ETT and Echo were reviewed. Pt will follow up in 6 months.

## 2022-05-09 NOTE — H&P ADULT - DOES THIS PATIENT HAVE A HISTORY OF OR HAS BEEN DX WITH HEART FAILURE?
Baseline is 0 6-0 8  Creatinine on admit is  1 2  Will continue iv fluids  Check bmp in am  Hold nephrotoxic medications no

## 2022-08-01 ENCOUNTER — NON-APPOINTMENT (OUTPATIENT)
Age: 67
End: 2022-08-01

## 2022-08-01 DIAGNOSIS — L23.7 ALLERGIC CONTACT DERMATITIS DUE TO PLANTS, EXCEPT FOOD: ICD-10-CM

## 2022-08-02 ENCOUNTER — NON-APPOINTMENT (OUTPATIENT)
Age: 67
End: 2022-08-02

## 2022-08-19 ENCOUNTER — APPOINTMENT (OUTPATIENT)
Dept: INTERNAL MEDICINE | Facility: CLINIC | Age: 67
End: 2022-08-19

## 2022-08-19 VITALS
TEMPERATURE: 98.8 F | BODY MASS INDEX: 33.07 KG/M2 | HEIGHT: 75 IN | DIASTOLIC BLOOD PRESSURE: 74 MMHG | SYSTOLIC BLOOD PRESSURE: 112 MMHG | WEIGHT: 266 LBS | RESPIRATION RATE: 16 BRPM | OXYGEN SATURATION: 97 % | HEART RATE: 96 BPM

## 2022-08-19 DIAGNOSIS — M89.8X8 OTHER SPECIFIED DISORDERS OF BONE, OTHER SITE: ICD-10-CM

## 2022-08-19 PROCEDURE — 99213 OFFICE O/P EST LOW 20 MIN: CPT

## 2022-08-19 NOTE — PLAN
[FreeTextEntry1] : Patient is complaining of of pain in the area of the left iliac crest.  There are no findings on physical examination.  He will be sent for x-ray of the pelvis and left hip to rule out occult fracture.  He will continue to use Tylenol and/or ibuprofen for analgesic relief.

## 2022-08-19 NOTE — HISTORY OF PRESENT ILLNESS
[FreeTextEntry8] : Patient presents for an acute evaluation complaining of pain on the left side for 4 to 6 weeks.  The pain is in the area of the iliac crest.  He has had no trauma to the area.  It is intermittent in nature.  Today, the patient has no pain.  When he does experience the pain it is worse in the morning.  It is also worse when he puts pressure on his leg and going downstairs or stepping off a curb.  Again, there is no trauma to the area.  There is no evidence of sciatica.

## 2022-08-22 ENCOUNTER — OUTPATIENT (OUTPATIENT)
Dept: OUTPATIENT SERVICES | Facility: HOSPITAL | Age: 67
LOS: 1 days | End: 2022-08-22
Payer: MEDICARE

## 2022-08-22 ENCOUNTER — APPOINTMENT (OUTPATIENT)
Dept: RADIOLOGY | Facility: CLINIC | Age: 67
End: 2022-08-22

## 2022-08-22 DIAGNOSIS — Z98.890 OTHER SPECIFIED POSTPROCEDURAL STATES: Chronic | ICD-10-CM

## 2022-08-22 DIAGNOSIS — M89.8X8 OTHER SPECIFIED DISORDERS OF BONE, OTHER SITE: ICD-10-CM

## 2022-08-22 DIAGNOSIS — M76.02 GLUTEAL TENDINITIS, LEFT HIP: ICD-10-CM

## 2022-08-22 PROCEDURE — 73502 X-RAY EXAM HIP UNI 2-3 VIEWS: CPT | Mod: 26,LT

## 2022-08-22 PROCEDURE — 72190 X-RAY EXAM OF PELVIS: CPT

## 2022-08-22 PROCEDURE — 73502 X-RAY EXAM HIP UNI 2-3 VIEWS: CPT

## 2022-08-24 ENCOUNTER — NON-APPOINTMENT (OUTPATIENT)
Age: 67
End: 2022-08-24

## 2022-09-02 ENCOUNTER — NON-APPOINTMENT (OUTPATIENT)
Age: 67
End: 2022-09-02

## 2022-10-11 ENCOUNTER — APPOINTMENT (OUTPATIENT)
Dept: INTERNAL MEDICINE | Facility: CLINIC | Age: 67
End: 2022-10-11

## 2022-10-12 ENCOUNTER — MED ADMIN CHARGE (OUTPATIENT)
Age: 67
End: 2022-10-12

## 2022-10-12 ENCOUNTER — APPOINTMENT (OUTPATIENT)
Dept: INTERNAL MEDICINE | Facility: CLINIC | Age: 67
End: 2022-10-12

## 2022-10-12 VITALS
DIASTOLIC BLOOD PRESSURE: 72 MMHG | TEMPERATURE: 97.6 F | HEART RATE: 78 BPM | WEIGHT: 256 LBS | BODY MASS INDEX: 32 KG/M2 | OXYGEN SATURATION: 96 % | SYSTOLIC BLOOD PRESSURE: 114 MMHG | RESPIRATION RATE: 14 BRPM

## 2022-10-12 DIAGNOSIS — Z23 ENCOUNTER FOR IMMUNIZATION: ICD-10-CM

## 2022-10-12 DIAGNOSIS — E55.9 VITAMIN D DEFICIENCY, UNSPECIFIED: ICD-10-CM

## 2022-10-12 PROCEDURE — G0009: CPT

## 2022-10-12 PROCEDURE — 90677 PCV20 VACCINE IM: CPT

## 2022-10-12 PROCEDURE — G0439: CPT

## 2022-10-12 RX ORDER — METHYLPREDNISOLONE 4 MG/1
4 TABLET ORAL
Qty: 1 | Refills: 0 | Status: DISCONTINUED | COMMUNITY
Start: 2022-08-01 | End: 2022-10-12

## 2022-10-12 NOTE — PHYSICAL EXAM
[Normal Sphincter Tone] : normal sphincter tone [No Mass] : no mass [Coordination Grossly Intact] : coordination grossly intact [Normal Gait] : normal gait [Normal Affect] : the affect was normal [Normal Insight/Judgement] : insight and judgment were intact [General Appearance - Alert] : alert [General Appearance - In No Acute Distress] : in no acute distress [Sclera] : the sclera and conjunctiva were normal [PERRL With Normal Accommodation] : pupils were equal in size, round, and reactive to light [Extraocular Movements] : extraocular movements were intact [Neck Appearance] : the appearance of the neck was normal [Neck Cervical Mass (___cm)] : no neck mass was observed [Jugular Venous Distention Increased] : there was no jugular-venous distention [Auscultation Breath Sounds / Voice Sounds] : lungs were clear to auscultation bilaterally [Heart Rate And Rhythm] : heart rate was normal and rhythm regular [Heart Sounds] : normal S1 and S2 [Heart Sounds Gallop] : no gallops [Murmurs] : no murmurs [Heart Sounds Pericardial Friction Rub] : no pericardial rub [Arterial Pulses Carotid] : carotid pulses were normal with no bruits [Bowel Sounds] : normal bowel sounds [Abdomen Soft] : soft [Abdomen Tenderness] : non-tender [] : no hepato-splenomegaly [Cervical Lymph Nodes Enlarged Posterior Bilaterally] : posterior cervical [Cervical Lymph Nodes Enlarged Anterior Bilaterally] : anterior cervical [Supraclavicular Lymph Nodes Enlarged Bilaterally] : supraclavicular [No CVA Tenderness] : no ~M costovertebral angle tenderness [No Spinal Tenderness] : no spinal tenderness [Nail Clubbing] : no clubbing  or cyanosis of the fingernails [Stool Occult Blood] : stool negative for occult blood [de-identified] : the base of the prostate was barely palpable and was smooth [de-identified] : hyperpigmented nevi are noted [FreeTextEntry1] : Centripetal obesity. A small umbilical hernia which is easily reducible, is noted.

## 2022-10-12 NOTE — PLAN
[FreeTextEntry1] : 1. I recommended he have the COVID-19 booster this fall. \par \par 2. Exercise regularly and comply with diet. \par \par 3. Continue medications as outlined above. \par \par 4. I recommended he have the Shingrix vaccine at his pharmacy. \par \par 5. Referral given to complete blood work ASAP to access his increase of Crestor from 5 mg to 10 mg; Lipid panel, LFT and CPK. \par \par 6. Follow up with urology.  He has been referred to Dr. Wood\par \par 7.  I recommended that he reinstitute his nasal CPAP, and wear it nocturnally.  If he is unable to comply, we will need to consider other options.  He will attempt to use the Breathe Right nasal strips, to help decrease the snoring which may be emanating from a deviated septum.\par \par Follow up in 6 months for yearly routine blood work and PFT.

## 2022-10-12 NOTE — HISTORY OF PRESENT ILLNESS
[FreeTextEntry1] : The patient presents for a comprehensive evaluation.  [de-identified] : The patient is doing well. He was diagnosed with COVID-19 in January 2022. He had been experiencing coughing spells and palpitations for some time after. He was hospitalized in March 2022 and his sxs were treated. \par \par He was diagnosed with sleep apnea in 2014, but does not use his CPAP mask. He has intermittent issues with sleeping and snoring. He saw an ENT last year to discuss correcting a deviated septum.  He has not yet decided on any surgical intervention.  Denies SOB and wheezing.  He does still snore intermittently.\par \par He does not perform any formal exercises. He occasionally walks during his lunch breaks. \par \par He is maintained on Losartan 50 mg daily for HTN. He recently had his Rosuvastatin increased from 5 mg to 10 mg. He is regularly followed by his cardiologist, Dr. Wynn. Denies chest pain, chest tightness or palpitations at this time. \par \par He has not followed up with urology in several years. \par \par He had the COVID-19 vaccines and no boosters. He is due for a PNA and Shingles vaccine. \par \par No other complaints at this time.

## 2022-10-24 DIAGNOSIS — L23.7 ALLERGIC CONTACT DERMATITIS DUE TO PLANTS, EXCEPT FOOD: ICD-10-CM

## 2022-10-27 ENCOUNTER — NON-APPOINTMENT (OUTPATIENT)
Age: 67
End: 2022-10-27

## 2022-10-28 ENCOUNTER — APPOINTMENT (OUTPATIENT)
Dept: CARDIOLOGY | Facility: CLINIC | Age: 67
End: 2022-10-28

## 2022-10-28 VITALS
BODY MASS INDEX: 32.35 KG/M2 | HEIGHT: 75 IN | WEIGHT: 260.14 LBS | DIASTOLIC BLOOD PRESSURE: 70 MMHG | SYSTOLIC BLOOD PRESSURE: 124 MMHG | OXYGEN SATURATION: 98 % | HEART RATE: 74 BPM

## 2022-10-28 DIAGNOSIS — R55 SYNCOPE AND COLLAPSE: ICD-10-CM

## 2022-10-28 DIAGNOSIS — Z95.2 PRESENCE OF PROSTHETIC HEART VALVE: ICD-10-CM

## 2022-10-28 PROCEDURE — 93000 ELECTROCARDIOGRAM COMPLETE: CPT

## 2022-10-28 PROCEDURE — 99214 OFFICE O/P EST MOD 30 MIN: CPT | Mod: 25

## 2022-10-28 NOTE — HISTORY OF PRESENT ILLNESS
[FreeTextEntry1] : 68 yo male presents for evaluation of chest pain which resolved. Pt is currently asymptomatic. Pt is being followed for MV disease, HLD, HTN. Pt reports bouts of dizziness without LOC recently. GI report was evaluated. Pt is being treated for poison ivy with a medrol dose kamille.

## 2022-10-28 NOTE — DISCUSSION/SUMMARY
[FreeTextEntry4] : near syncope [FreeTextEntry1] : Elevated triglycerides. Pt will have a Zio to evaluate for arrhythmia. Carotid doppler. Follow up in 2 months [EKG obtained to assist in diagnosis and management of assessed problem(s)] : EKG obtained to assist in diagnosis and management of assessed problem(s)

## 2022-10-28 NOTE — REASON FOR VISIT
[Symptom and Test Evaluation] : symptom and test evaluation [Structural Heart and Valve Disease] : structural heart and valve disease [FreeTextEntry3] : near syncope

## 2022-11-01 ENCOUNTER — APPOINTMENT (OUTPATIENT)
Dept: ULTRASOUND IMAGING | Facility: CLINIC | Age: 67
End: 2022-11-01

## 2022-11-15 ENCOUNTER — APPOINTMENT (OUTPATIENT)
Dept: CARDIOLOGY | Facility: CLINIC | Age: 67
End: 2022-11-15

## 2022-11-15 PROCEDURE — 93880 EXTRACRANIAL BILAT STUDY: CPT

## 2022-12-08 ENCOUNTER — APPOINTMENT (OUTPATIENT)
Dept: CARDIOLOGY | Facility: CLINIC | Age: 67
End: 2022-12-08
Payer: MEDICARE

## 2022-12-08 VITALS
WEIGHT: 262 LBS | HEART RATE: 93 BPM | BODY MASS INDEX: 32.58 KG/M2 | SYSTOLIC BLOOD PRESSURE: 138 MMHG | HEIGHT: 75 IN | OXYGEN SATURATION: 95 % | TEMPERATURE: 96.5 F | DIASTOLIC BLOOD PRESSURE: 82 MMHG

## 2022-12-08 PROCEDURE — 99214 OFFICE O/P EST MOD 30 MIN: CPT | Mod: 25

## 2022-12-08 PROCEDURE — 93000 ELECTROCARDIOGRAM COMPLETE: CPT | Mod: 59

## 2022-12-16 NOTE — HISTORY OF PRESENT ILLNESS
[FreeTextEntry1] : 66 yo male presents for evaluation of chest pain which resolved. Pt is currently asymptomatic. Pt is here to review testing.

## 2022-12-16 NOTE — DISCUSSION/SUMMARY
[FreeTextEntry4] : near syncope [FreeTextEntry1] : Elevated triglycerides. Zio was reviewed. Pt will follow up in 6 months.

## 2023-03-01 ENCOUNTER — APPOINTMENT (OUTPATIENT)
Dept: NEUROLOGY | Facility: CLINIC | Age: 68
End: 2023-03-01
Payer: MEDICARE

## 2023-03-01 DIAGNOSIS — R41.3 OTHER AMNESIA: ICD-10-CM

## 2023-03-01 PROCEDURE — 99204 OFFICE O/P NEW MOD 45 MIN: CPT

## 2023-03-01 NOTE — PHYSICAL EXAM
[FreeTextEntry1] : awake, alert, oriented x3 (date, location, situation), know the current and past 5 presidents.\par speech is fluent, language appropriate\par TRINY, right eye amblyopia, FAce symmetric, facial sensation intact to LT BL, tongue midline\par BLUEs and BLLEs 5/5 proximally and distally\par Sensation intact to LT throughout\par cerebellum: FTN intact BL\par Gait: Normal, pt is able to walk on hills, toes, and is able to squat

## 2023-03-01 NOTE — ASSESSMENT
[FreeTextEntry1] : 68 YO M w/memory lapses over the past year - possible dementia vs pseudodementia vs other\par - will order MRI brain w/o contrast\par - B12, TSH, Folate, B6\par - neuropsych refer to test memory\par discussed above with pt. follow up in one month

## 2023-03-01 NOTE — REVIEW OF SYSTEMS
[As Noted in HPI] : as noted in HPI [FreeTextEntry2] : 10 systems were reviewed and are negative except whats in HPI

## 2023-03-08 ENCOUNTER — OUTPATIENT (OUTPATIENT)
Dept: OUTPATIENT SERVICES | Facility: HOSPITAL | Age: 68
LOS: 1 days | End: 2023-03-08
Payer: MEDICARE

## 2023-03-08 ENCOUNTER — APPOINTMENT (OUTPATIENT)
Dept: MRI IMAGING | Facility: CLINIC | Age: 68
End: 2023-03-08
Payer: MEDICARE

## 2023-03-08 DIAGNOSIS — R41.3 OTHER AMNESIA: ICD-10-CM

## 2023-03-08 DIAGNOSIS — Z98.890 OTHER SPECIFIED POSTPROCEDURAL STATES: Chronic | ICD-10-CM

## 2023-03-08 PROCEDURE — 70551 MRI BRAIN STEM W/O DYE: CPT

## 2023-03-08 PROCEDURE — 70551 MRI BRAIN STEM W/O DYE: CPT | Mod: 26

## 2023-04-13 ENCOUNTER — APPOINTMENT (OUTPATIENT)
Dept: NEUROLOGY | Facility: CLINIC | Age: 68
End: 2023-04-13

## 2023-04-26 ENCOUNTER — APPOINTMENT (OUTPATIENT)
Dept: NEUROLOGY | Facility: CLINIC | Age: 68
End: 2023-04-26
Payer: MEDICARE

## 2023-04-26 VITALS
WEIGHT: 262 LBS | BODY MASS INDEX: 32.58 KG/M2 | HEART RATE: 90 BPM | DIASTOLIC BLOOD PRESSURE: 80 MMHG | OXYGEN SATURATION: 95 % | HEIGHT: 75 IN | SYSTOLIC BLOOD PRESSURE: 138 MMHG

## 2023-04-26 PROCEDURE — 99214 OFFICE O/P EST MOD 30 MIN: CPT

## 2023-04-26 NOTE — ASSESSMENT
[FreeTextEntry1] : 66 YO M w/memory issues is here for a follow up visit - pt denies any new neurological symptoms, still having episodes of forgetfulness. Denies feeling depressed at this time but is willing to go to psychiatrist for evaluation\par - neuropsych testing\par - psychiatry follow up\par - neurology follow up in 4-6 weeks - post neuropsych testing\par

## 2023-04-26 NOTE — HISTORY OF PRESENT ILLNESS
[FreeTextEntry1] : 68 YO M is here for a follow up visit for memory issues. Pt underwent MRI brain and labs, all came back unremarkable. He was supposed to undergo neuropsych testing but have not been able to make an appointment yet. Denies any focal weakness, numbness, visual changes, speech or language abnormalities.

## 2023-04-26 NOTE — PHYSICAL EXAM
[FreeTextEntry1] : awake, alert, oriented x3 (date, location, situation), know the current and past 5 presidents.\par speech is fluent, language appropriate\par TRINY, right eye amblyopia, FAce symmetric, facial sensation intact to LT BL, tongue midline\par BLUEs and BLLEs 5/5 proximally and distally\par Sensation intact to LT throughout\par cerebellum: FTN intact BL\par Gait: Normal, pt is able to walk on hills, toes, and is able to squat.

## 2023-06-30 ENCOUNTER — APPOINTMENT (OUTPATIENT)
Dept: CARDIOLOGY | Facility: CLINIC | Age: 68
End: 2023-06-30
Payer: MEDICARE

## 2023-06-30 VITALS
HEIGHT: 75 IN | BODY MASS INDEX: 32.95 KG/M2 | HEART RATE: 55 BPM | WEIGHT: 265 LBS | OXYGEN SATURATION: 96 % | SYSTOLIC BLOOD PRESSURE: 88 MMHG | DIASTOLIC BLOOD PRESSURE: 80 MMHG

## 2023-06-30 PROCEDURE — 99214 OFFICE O/P EST MOD 30 MIN: CPT | Mod: 25

## 2023-06-30 PROCEDURE — 93000 ELECTROCARDIOGRAM COMPLETE: CPT

## 2023-06-30 NOTE — HISTORY OF PRESENT ILLNESS
[FreeTextEntry1] : 68 yo male presents for evaluation of chest pain which resolved. Pt is currently asymptomatic. Pt is here to review testing. Pt had labs recently. Pt has been seen by a Neuro for memory issues. No chest pain or shortness of breath. He is active outside cutting grass etc.

## 2023-06-30 NOTE — DISCUSSION/SUMMARY
[FreeTextEntry4] : near syncope [FreeTextEntry1] : Elevated triglycerides. Zio was reviewed. Pt will follow up in 6 months. Pt will continue to hydrate. Pt will notify us if has further symptoms of dizziness. F/U in 6 months.  [EKG obtained to assist in diagnosis and management of assessed problem(s)] : EKG obtained to assist in diagnosis and management of assessed problem(s)

## 2023-10-13 ENCOUNTER — APPOINTMENT (OUTPATIENT)
Dept: INTERNAL MEDICINE | Facility: CLINIC | Age: 68
End: 2023-10-13
Payer: MEDICARE

## 2023-10-13 VITALS
RESPIRATION RATE: 14 BRPM | SYSTOLIC BLOOD PRESSURE: 110 MMHG | BODY MASS INDEX: 31.83 KG/M2 | HEART RATE: 70 BPM | OXYGEN SATURATION: 96 % | WEIGHT: 256 LBS | HEIGHT: 75 IN | TEMPERATURE: 98 F | DIASTOLIC BLOOD PRESSURE: 80 MMHG

## 2023-10-13 DIAGNOSIS — I10 ESSENTIAL (PRIMARY) HYPERTENSION: ICD-10-CM

## 2023-10-13 DIAGNOSIS — E66.9 OBESITY, UNSPECIFIED: ICD-10-CM

## 2023-10-13 PROCEDURE — 99214 OFFICE O/P EST MOD 30 MIN: CPT

## 2023-10-13 RX ORDER — CLINDAMYCIN HYDROCHLORIDE 300 MG/1
300 CAPSULE ORAL
Qty: 21 | Refills: 0 | Status: DISCONTINUED | COMMUNITY
Start: 2022-06-13 | End: 2023-10-13

## 2023-10-13 RX ORDER — METOPROLOL SUCCINATE 25 MG/1
25 TABLET, EXTENDED RELEASE ORAL DAILY
Qty: 90 | Refills: 3 | Status: ACTIVE | COMMUNITY
Start: 2022-12-08 | End: 1900-01-01

## 2023-10-17 ENCOUNTER — NON-APPOINTMENT (OUTPATIENT)
Age: 68
End: 2023-10-17

## 2023-11-28 ENCOUNTER — APPOINTMENT (OUTPATIENT)
Dept: NEUROLOGY | Facility: CLINIC | Age: 68
End: 2023-11-28

## 2023-12-05 ENCOUNTER — APPOINTMENT (OUTPATIENT)
Dept: INTERNAL MEDICINE | Facility: CLINIC | Age: 68
End: 2023-12-05
Payer: MEDICARE

## 2023-12-05 VITALS
DIASTOLIC BLOOD PRESSURE: 69 MMHG | TEMPERATURE: 97.7 F | OXYGEN SATURATION: 96 % | BODY MASS INDEX: 32.98 KG/M2 | WEIGHT: 257 LBS | SYSTOLIC BLOOD PRESSURE: 115 MMHG | HEART RATE: 71 BPM | RESPIRATION RATE: 16 BRPM | HEIGHT: 74 IN

## 2023-12-05 DIAGNOSIS — G47.33 OBSTRUCTIVE SLEEP APNEA (ADULT) (PEDIATRIC): ICD-10-CM

## 2023-12-05 DIAGNOSIS — Z00.00 ENCOUNTER FOR GENERAL ADULT MEDICAL EXAMINATION W/OUT ABNORMAL FINDINGS: ICD-10-CM

## 2023-12-05 DIAGNOSIS — J45.991 COUGH VARIANT ASTHMA: ICD-10-CM

## 2023-12-05 DIAGNOSIS — I38 ENDOCARDITIS, VALVE UNSPECIFIED: ICD-10-CM

## 2023-12-05 DIAGNOSIS — J45.20 MILD INTERMITTENT ASTHMA, UNCOMPLICATED: ICD-10-CM

## 2023-12-05 DIAGNOSIS — J98.4 OTHER DISORDERS OF LUNG: ICD-10-CM

## 2023-12-05 DIAGNOSIS — I47.10 SUPRAVENTRICULAR TACHYCARDIA, UNSPECIFIED: ICD-10-CM

## 2023-12-05 PROCEDURE — 99214 OFFICE O/P EST MOD 30 MIN: CPT | Mod: 25

## 2023-12-05 PROCEDURE — 94727 GAS DIL/WSHOT DETER LNG VOL: CPT

## 2023-12-05 PROCEDURE — ZZZZZ: CPT

## 2023-12-05 PROCEDURE — 94729 DIFFUSING CAPACITY: CPT

## 2023-12-05 PROCEDURE — 94060 EVALUATION OF WHEEZING: CPT

## 2023-12-05 RX ORDER — MUPIROCIN 20 MG/G
2 OINTMENT TOPICAL
Refills: 0 | Status: ACTIVE | COMMUNITY

## 2023-12-19 ENCOUNTER — TRANSCRIPTION ENCOUNTER (OUTPATIENT)
Age: 68
End: 2023-12-19

## 2024-02-26 RX ORDER — TAMSULOSIN HYDROCHLORIDE 0.4 MG/1
0.4 CAPSULE ORAL
Qty: 90 | Refills: 3 | Status: ACTIVE | COMMUNITY
Start: 2018-04-17 | End: 1900-01-01

## 2024-03-25 NOTE — ED STATDOCS - DATE/TIME 3
Medicine Refill Request    Last Office Visit: 10/16/2023   Last Consult Visit: Visit date not found  Last Telemedicine Visit: Visit date not found    Next Appointment: 4/16/2024      Current Outpatient Medications:     amLODIPine (NORVASC) 5 mg tablet, Take 1 tablet (5 mg total) by mouth daily., Disp: 90 tablet, Rfl: 1    aspirin 81 mg enteric coated tablet, 81 mg., Disp: , Rfl:     cetirizine (ZyrTEC) 10 mg tablet, 10 mg., Disp: , Rfl:     hydrochlorothiazide (HYDRODIURIL) 25 mg tablet, Take 1 tablet (25 mg total) by mouth once daily., Disp: 90 tablet, Rfl: 1    multivitamin with iron tablet, Take by mouth daily., Disp: , Rfl:       BP Readings from Last 3 Encounters:   10/16/23 140/82   03/20/23 (!) 154/90   09/26/22 120/72       Recent Lab results:  Lab Results   Component Value Date    CHOL 184 10/11/2023   ,   Lab Results   Component Value Date    HDL 81 10/11/2023   ,   Lab Results   Component Value Date    LDLCALC 92 10/11/2023   ,   Lab Results   Component Value Date    TRIG 58 10/11/2023        Lab Results   Component Value Date    GLUCOSE 91 10/11/2023   ,   Lab Results   Component Value Date    HGBA1C 5.5 10/11/2023         Lab Results   Component Value Date    CREATININE 2.07 (H) 10/11/2023       Lab Results   Component Value Date    TSH 3.420 03/09/2023           Lab Results   Component Value Date    HGBA1C 5.5 10/11/2023       13-Jun-2019 12:01

## 2024-06-07 LAB
25(OH)D3 SERPL-MCNC: 38.2 NG/ML
ALBUMIN SERPL ELPH-MCNC: 4.6 G/DL
ALP BLD-CCNC: 91 U/L
ALT SERPL-CCNC: 18 U/L
ANION GAP SERPL CALC-SCNC: 18 MMOL/L
APPEARANCE: CLEAR
AST SERPL-CCNC: 26 U/L
BACTERIA: NEGATIVE /HPF
BASOPHILS # BLD AUTO: 0.06 K/UL
BASOPHILS NFR BLD AUTO: 1.1 %
BILIRUB SERPL-MCNC: 0.7 MG/DL
BILIRUBIN URINE: NEGATIVE
BLOOD URINE: NEGATIVE
BUN SERPL-MCNC: 24 MG/DL
CALCIUM SERPL-MCNC: 9.5 MG/DL
CAST: 0 /LPF
CHLORIDE SERPL-SCNC: 105 MMOL/L
CHOLEST SERPL-MCNC: 191 MG/DL
CO2 SERPL-SCNC: 18 MMOL/L
COLOR: YELLOW
CREAT SERPL-MCNC: 1.3 MG/DL
EGFR: 60 ML/MIN/1.73M2
EOSINOPHIL # BLD AUTO: 0.14 K/UL
EOSINOPHIL NFR BLD AUTO: 2.5 %
EPITHELIAL CELLS: 0 /HPF
ESTIMATED AVERAGE GLUCOSE: 108 MG/DL
GLUCOSE QUALITATIVE U: NEGATIVE MG/DL
GLUCOSE SERPL-MCNC: 97 MG/DL
HBA1C MFR BLD HPLC: 5.4 %
HCT VFR BLD CALC: 48.6 %
HDLC SERPL-MCNC: 50 MG/DL
HGB BLD-MCNC: 16.4 G/DL
IMM GRANULOCYTES NFR BLD AUTO: 0.5 %
KETONES URINE: NEGATIVE MG/DL
LDLC SERPL CALC-MCNC: 115 MG/DL
LEUKOCYTE ESTERASE URINE: NEGATIVE
LYMPHOCYTES # BLD AUTO: 1.2 K/UL
LYMPHOCYTES NFR BLD AUTO: 21.2 %
MAN DIFF?: NORMAL
MCHC RBC-ENTMCNC: 30 PG
MCHC RBC-ENTMCNC: 33.7 GM/DL
MCV RBC AUTO: 88.8 FL
MICROSCOPIC-UA: NORMAL
MONOCYTES # BLD AUTO: 0.45 K/UL
MONOCYTES NFR BLD AUTO: 7.9 %
NEUTROPHILS # BLD AUTO: 3.79 K/UL
NEUTROPHILS NFR BLD AUTO: 66.8 %
NITRITE URINE: NEGATIVE
NONHDLC SERPL-MCNC: 141 MG/DL
PH URINE: 5.5
PLATELET # BLD AUTO: 129 K/UL
POTASSIUM SERPL-SCNC: 4.7 MMOL/L
PROT SERPL-MCNC: 7.1 G/DL
PROTEIN URINE: NEGATIVE MG/DL
PSA SERPL-MCNC: 3.69 NG/ML
RBC # BLD: 5.47 M/UL
RBC # FLD: 12.3 %
RED BLOOD CELLS URINE: 1 /HPF
SODIUM SERPL-SCNC: 141 MMOL/L
SPECIFIC GRAVITY URINE: 1.02
T3FREE SERPL-MCNC: 3.21 PG/ML
T4 FREE SERPL-MCNC: 1.2 NG/DL
TRIGL SERPL-MCNC: 147 MG/DL
TSH SERPL-ACNC: 3.85 UIU/ML
UROBILINOGEN URINE: 0.2 MG/DL
WBC # FLD AUTO: 5.67 K/UL
WHITE BLOOD CELLS URINE: 0 /HPF

## 2024-06-11 ENCOUNTER — APPOINTMENT (OUTPATIENT)
Dept: INTERNAL MEDICINE | Facility: CLINIC | Age: 69
End: 2024-06-11
Payer: MEDICARE

## 2024-06-11 VITALS
WEIGHT: 253 LBS | HEIGHT: 74 IN | BODY MASS INDEX: 32.47 KG/M2 | TEMPERATURE: 98.4 F | DIASTOLIC BLOOD PRESSURE: 78 MMHG | HEART RATE: 75 BPM | SYSTOLIC BLOOD PRESSURE: 128 MMHG | RESPIRATION RATE: 15 BRPM | OXYGEN SATURATION: 96 %

## 2024-06-11 DIAGNOSIS — E78.5 HYPERLIPIDEMIA, UNSPECIFIED: ICD-10-CM

## 2024-06-11 DIAGNOSIS — Z00.00 ENCOUNTER FOR GENERAL ADULT MEDICAL EXAMINATION W/OUT ABNORMAL FINDINGS: ICD-10-CM

## 2024-06-11 DIAGNOSIS — D69.6 THROMBOCYTOPENIA, UNSPECIFIED: ICD-10-CM

## 2024-06-11 PROCEDURE — 99397 PER PM REEVAL EST PAT 65+ YR: CPT | Mod: 25

## 2024-06-11 PROCEDURE — 90715 TDAP VACCINE 7 YRS/> IM: CPT

## 2024-06-11 PROCEDURE — 90471 IMMUNIZATION ADMIN: CPT

## 2024-06-11 NOTE — PLAN
[FreeTextEntry1] : 1. Continue current medications as outlined above.  2. The patient will receive the TDAP vaccine in office today. 6/11/24.   3. The patient will begin taking Rosuvastatin 20 MG once nocturnally. He was previously maintained on 10 MG.   4. The patient will undergo fasting bloodwork including a lipid panel, CPK, and hepatic panel in 6 months prior to his next visit.  This will be to reevaluate the thrombocytopenia, as well as his repeat lipids after increasing the dose of the rosuvastatin from 10 to 20 mg daily.  5. Follow up in 6 months with PFT and review of the bloodwork.   6. Follow up with cardiologist, Dr. Mariee for monitoring of HTN, HLD, and SVT.    7. The patient has been referred to urologist, Dr. Wood, for consultation regarding elevated PSA and BPH.    8. The Influenza vaccine is recommended in the fall.   9. Cardiovascular exercise as tolerated.

## 2024-06-11 NOTE — REVIEW OF SYSTEMS
[Dizziness] : dizziness [Memory Loss] : memory loss [Negative] : Heme/Lymph [de-identified] : see hpi

## 2024-06-11 NOTE — PHYSICAL EXAM
[Normal Sphincter Tone] : normal sphincter tone [No Mass] : no mass [Coordination Grossly Intact] : coordination grossly intact [Normal Gait] : normal gait [Normal Affect] : the affect was normal [Normal Insight/Judgement] : insight and judgment were intact [General Appearance - Alert] : alert [General Appearance - In No Acute Distress] : in no acute distress [Sclera] : the sclera and conjunctiva were normal [PERRL With Normal Accommodation] : pupils were equal in size, round, and reactive to light [Extraocular Movements] : extraocular movements were intact [Neck Appearance] : the appearance of the neck was normal [Neck Cervical Mass (___cm)] : no neck mass was observed [Jugular Venous Distention Increased] : there was no jugular-venous distention [Auscultation Breath Sounds / Voice Sounds] : lungs were clear to auscultation bilaterally [Heart Rate And Rhythm] : heart rate was normal and rhythm regular [Heart Sounds] : normal S1 and S2 [Heart Sounds Gallop] : no gallops [Murmurs] : no murmurs [Heart Sounds Pericardial Friction Rub] : no pericardial rub [Arterial Pulses Carotid] : carotid pulses were normal with no bruits [Bowel Sounds] : normal bowel sounds [Abdomen Soft] : soft [Abdomen Tenderness] : non-tender [] : no hepato-splenomegaly [Cervical Lymph Nodes Enlarged Posterior Bilaterally] : posterior cervical [Cervical Lymph Nodes Enlarged Anterior Bilaterally] : anterior cervical [Supraclavicular Lymph Nodes Enlarged Bilaterally] : supraclavicular [No CVA Tenderness] : no ~M costovertebral angle tenderness [No Spinal Tenderness] : no spinal tenderness [Nail Clubbing] : no clubbing  or cyanosis of the fingernails [Stool Occult Blood] : stool negative for occult blood [de-identified] : the base of the prostate was barely palpable and was smooth [de-identified] : hyperpigmented nevi are noted [FreeTextEntry1] : Centripetal obesity. A small umbilical hernia which is easily reducible, is noted.

## 2024-06-11 NOTE — ADDENDUM
[FreeTextEntry1] : This note was written by Mandy Yao on 06/11/2024 acting as a medical scribe for Dr. Lopez Grewal MD. All medical entries made by the scribe were at my, Dr. Lopez Grewal's, direction and personally dictated by me on 06/11/2024. I have reviewed the chart and agree that the record accurately reflects my personal performance of the history, assessment and plan. I have also personally directed, reviewed, and agreed with the chart.

## 2024-06-11 NOTE — HISTORY OF PRESENT ILLNESS
[FreeTextEntry1] :  The patient comes in for a yearly wellness evaluation. [de-identified] : The patient is feeling well at this time. The patient has Hypertension for which he continues Losartan 50 MG once daily. He has a history of SVT for which he takes Metoprolol 25 MG once daily. He also has a history of Hyperlipidemia and continues to take Co Q 10 100 MG and Rosuvastatin 10 MG once daily. He is generally tolerating the statin agent well, denying any severe muscle aches or any other overt symptoms. There has been no chest pain, shortness of breath, palpitations, or PND. He does report an occasional episode of dizziness. He continues to follow with cardiologist, Dr. Mariee. The patient has lost approximately 12 pounds since June 2023.  He also has a history of BPH for which he is maintained on Tamsulosin 0.4 MG once daily. He has not followed with a urologist.   The patient is being followed by a neurologist, Dr. Villalobos regarding memory issues.  The patient reports that he feels well from a pulmonary standpoint. He does have a history of mild persistent asthma, though he is not maintained on any metered dose inhalers. There has been no cough, sputum production, or wheeze. There have been no recent exacerbations of the patient's asthma.  The patient is up to date on his colonoscopy. The patient is up to date on vaccinations with the exception of the TDAP vaccine. There have been no cough, fevers, chills, or night sweats. There have been no other acute constitutional symptoms. He comes in for this assessment.

## 2024-07-15 ENCOUNTER — APPOINTMENT (OUTPATIENT)
Dept: PODIATRY | Facility: CLINIC | Age: 69
End: 2024-07-15

## 2024-07-23 ENCOUNTER — APPOINTMENT (OUTPATIENT)
Dept: UROLOGY | Facility: CLINIC | Age: 69
End: 2024-07-23
Payer: MEDICARE

## 2024-07-23 VITALS
HEART RATE: 60 BPM | OXYGEN SATURATION: 95 % | WEIGHT: 253 LBS | HEIGHT: 74 IN | SYSTOLIC BLOOD PRESSURE: 127 MMHG | DIASTOLIC BLOOD PRESSURE: 67 MMHG | BODY MASS INDEX: 32.47 KG/M2

## 2024-07-23 DIAGNOSIS — N40.1 BENIGN PROSTATIC HYPERPLASIA WITH LOWER URINARY TRACT SYMPMS: ICD-10-CM

## 2024-07-23 DIAGNOSIS — Z78.9 OTHER SPECIFIED HEALTH STATUS: ICD-10-CM

## 2024-07-23 DIAGNOSIS — R97.20 ELEVATED PROSTATE, SPECIFIC ANTIGEN [PSA]: ICD-10-CM

## 2024-07-23 DIAGNOSIS — N13.8 BENIGN PROSTATIC HYPERPLASIA WITH LOWER URINARY TRACT SYMPMS: ICD-10-CM

## 2024-07-23 DIAGNOSIS — Z80.8 FAMILY HISTORY OF MALIGNANT NEOPLASM OF OTHER ORGANS OR SYSTEMS: ICD-10-CM

## 2024-07-23 PROCEDURE — 99203 OFFICE O/P NEW LOW 30 MIN: CPT

## 2024-07-23 NOTE — ASSESSMENT
[FreeTextEntry1] : 70 yo M with BPH with LUTS, well managed with tamsulosin. Discussed TURP and urolift as alternatives. For now, will continue with tamsulosin 0.4 mg daily.  For PSA elevation, discussed that this is within acceptable range for his age. Will repeat in 3 months.

## 2024-07-23 NOTE — REVIEW OF SYSTEMS
[Negative] : Heme/Lymph [Seen by urologist before (Name)  ___] : Preciously seen by a urologist: [unfilled] [Wake up at night to urinate  How many times?  ___] : wakes up to urinate [unfilled] times during the night [Strong urge to urinate] : strong urge to urinate [Strain or push to urinate] : strain or push to urinate [Joint Pain] : joint pain [Dizziness] : dizziness [see HPI] : see HPI [Anxiety] : anxiety

## 2024-07-23 NOTE — HISTORY OF PRESENT ILLNESS
[FreeTextEntry1] : 69 year old man seen 07/23/2024 with complaint of PSA rise. He reports PSA has been slowly rising, was "in the 2s" then most recently up to 3.6. No family history of prostate cancer. Of note, he has BPH but LUTS are well controlled with tamsulosin 0.4 mg daily.

## 2024-07-25 ENCOUNTER — TRANSCRIPTION ENCOUNTER (OUTPATIENT)
Age: 69
End: 2024-07-25

## 2024-07-26 ENCOUNTER — NON-APPOINTMENT (OUTPATIENT)
Age: 69
End: 2024-07-26

## 2024-07-29 ENCOUNTER — TRANSCRIPTION ENCOUNTER (OUTPATIENT)
Age: 69
End: 2024-07-29

## 2024-07-31 ENCOUNTER — TRANSCRIPTION ENCOUNTER (OUTPATIENT)
Age: 69
End: 2024-07-31

## 2024-10-16 ENCOUNTER — LABORATORY RESULT (OUTPATIENT)
Age: 69
End: 2024-10-16

## 2024-10-28 ENCOUNTER — APPOINTMENT (OUTPATIENT)
Dept: UROLOGY | Facility: CLINIC | Age: 69
End: 2024-10-28
Payer: MEDICARE

## 2024-10-28 DIAGNOSIS — N40.1 BENIGN PROSTATIC HYPERPLASIA WITH LOWER URINARY TRACT SYMPMS: ICD-10-CM

## 2024-10-28 DIAGNOSIS — R97.20 ELEVATED PROSTATE, SPECIFIC ANTIGEN [PSA]: ICD-10-CM

## 2024-10-28 DIAGNOSIS — N13.8 BENIGN PROSTATIC HYPERPLASIA WITH LOWER URINARY TRACT SYMPMS: ICD-10-CM

## 2024-10-28 PROCEDURE — 99213 OFFICE O/P EST LOW 20 MIN: CPT

## 2024-11-26 ENCOUNTER — APPOINTMENT (OUTPATIENT)
Dept: MRI IMAGING | Facility: CLINIC | Age: 69
End: 2024-11-26

## 2024-12-02 ENCOUNTER — OUTPATIENT (OUTPATIENT)
Dept: OUTPATIENT SERVICES | Facility: HOSPITAL | Age: 69
LOS: 1 days | End: 2024-12-02
Payer: MEDICARE

## 2024-12-02 ENCOUNTER — APPOINTMENT (OUTPATIENT)
Dept: MRI IMAGING | Facility: CLINIC | Age: 69
End: 2024-12-02
Payer: MEDICARE

## 2024-12-02 ENCOUNTER — RESULT REVIEW (OUTPATIENT)
Age: 69
End: 2024-12-02

## 2024-12-02 DIAGNOSIS — Z98.890 OTHER SPECIFIED POSTPROCEDURAL STATES: Chronic | ICD-10-CM

## 2024-12-02 DIAGNOSIS — R97.20 ELEVATED PROSTATE SPECIFIC ANTIGEN [PSA]: ICD-10-CM

## 2024-12-02 PROCEDURE — 72197 MRI PELVIS W/O & W/DYE: CPT | Mod: 26

## 2024-12-02 PROCEDURE — 76498P: CUSTOM | Mod: 26

## 2024-12-02 PROCEDURE — 72197 MRI PELVIS W/O & W/DYE: CPT

## 2024-12-02 PROCEDURE — 76498 UNLISTED MR PROCEDURE: CPT

## 2024-12-02 PROCEDURE — A9585: CPT

## 2024-12-08 ENCOUNTER — EMERGENCY (EMERGENCY)
Facility: HOSPITAL | Age: 69
LOS: 0 days | Discharge: HOME | End: 2024-12-08
Attending: EMERGENCY MEDICINE
Payer: MEDICARE

## 2024-12-08 VITALS
DIASTOLIC BLOOD PRESSURE: 87 MMHG | OXYGEN SATURATION: 95 % | TEMPERATURE: 98 F | RESPIRATION RATE: 21 BRPM | HEART RATE: 90 BPM | SYSTOLIC BLOOD PRESSURE: 138 MMHG

## 2024-12-08 VITALS — WEIGHT: 264.55 LBS | HEIGHT: 75 IN

## 2024-12-08 DIAGNOSIS — Z95.4 PRESENCE OF OTHER HEART-VALVE REPLACEMENT: ICD-10-CM

## 2024-12-08 DIAGNOSIS — I10 ESSENTIAL (PRIMARY) HYPERTENSION: ICD-10-CM

## 2024-12-08 DIAGNOSIS — R06.02 SHORTNESS OF BREATH: ICD-10-CM

## 2024-12-08 DIAGNOSIS — R07.89 OTHER CHEST PAIN: ICD-10-CM

## 2024-12-08 DIAGNOSIS — R42 DIZZINESS AND GIDDINESS: ICD-10-CM

## 2024-12-08 DIAGNOSIS — E78.5 HYPERLIPIDEMIA, UNSPECIFIED: ICD-10-CM

## 2024-12-08 DIAGNOSIS — Z98.890 OTHER SPECIFIED POSTPROCEDURAL STATES: Chronic | ICD-10-CM

## 2024-12-08 DIAGNOSIS — I44.4 LEFT ANTERIOR FASCICULAR BLOCK: ICD-10-CM

## 2024-12-08 DIAGNOSIS — R79.1 ABNORMAL COAGULATION PROFILE: ICD-10-CM

## 2024-12-08 DIAGNOSIS — I49.3 VENTRICULAR PREMATURE DEPOLARIZATION: ICD-10-CM

## 2024-12-08 DIAGNOSIS — R07.81 PLEURODYNIA: ICD-10-CM

## 2024-12-08 LAB
ADD ON TEST-SPECIMEN IN LAB: SIGNIFICANT CHANGE UP
ALBUMIN SERPL ELPH-MCNC: 3.8 G/DL — SIGNIFICANT CHANGE UP (ref 3.3–5)
ALP SERPL-CCNC: 80 U/L — SIGNIFICANT CHANGE UP (ref 40–120)
ALT FLD-CCNC: 31 U/L — SIGNIFICANT CHANGE UP (ref 12–78)
ANION GAP SERPL CALC-SCNC: 2 MMOL/L — LOW (ref 5–17)
APTT BLD: 30.8 SEC — SIGNIFICANT CHANGE UP (ref 24.5–35.6)
AST SERPL-CCNC: 29 U/L — SIGNIFICANT CHANGE UP (ref 15–37)
BASOPHILS # BLD AUTO: 0.06 K/UL — SIGNIFICANT CHANGE UP (ref 0–0.2)
BASOPHILS NFR BLD AUTO: 1 % — SIGNIFICANT CHANGE UP (ref 0–2)
BILIRUB SERPL-MCNC: 0.9 MG/DL — SIGNIFICANT CHANGE UP (ref 0.2–1.2)
BUN SERPL-MCNC: 23 MG/DL — SIGNIFICANT CHANGE UP (ref 7–23)
CALCIUM SERPL-MCNC: 9.9 MG/DL — SIGNIFICANT CHANGE UP (ref 8.5–10.1)
CHLORIDE SERPL-SCNC: 109 MMOL/L — HIGH (ref 96–108)
CO2 SERPL-SCNC: 26 MMOL/L — SIGNIFICANT CHANGE UP (ref 22–31)
CREAT SERPL-MCNC: 1.28 MG/DL — SIGNIFICANT CHANGE UP (ref 0.5–1.3)
D DIMER BLD IA.RAPID-MCNC: 379 NG/ML DDU — HIGH
EGFR: 61 ML/MIN/1.73M2 — SIGNIFICANT CHANGE UP
EOSINOPHIL # BLD AUTO: 0.12 K/UL — SIGNIFICANT CHANGE UP (ref 0–0.5)
EOSINOPHIL NFR BLD AUTO: 2 % — SIGNIFICANT CHANGE UP (ref 0–6)
GLUCOSE SERPL-MCNC: 110 MG/DL — HIGH (ref 70–99)
HCT VFR BLD CALC: 45.9 % — SIGNIFICANT CHANGE UP (ref 39–50)
HGB BLD-MCNC: 15.6 G/DL — SIGNIFICANT CHANGE UP (ref 13–17)
IMM GRANULOCYTES NFR BLD AUTO: 0.5 % — SIGNIFICANT CHANGE UP (ref 0–0.9)
INR BLD: 0.94 RATIO — SIGNIFICANT CHANGE UP (ref 0.85–1.16)
LYMPHOCYTES # BLD AUTO: 1.27 K/UL — SIGNIFICANT CHANGE UP (ref 1–3.3)
LYMPHOCYTES # BLD AUTO: 21.3 % — SIGNIFICANT CHANGE UP (ref 13–44)
MCHC RBC-ENTMCNC: 30.6 PG — SIGNIFICANT CHANGE UP (ref 27–34)
MCHC RBC-ENTMCNC: 34 G/DL — SIGNIFICANT CHANGE UP (ref 32–36)
MCV RBC AUTO: 90.2 FL — SIGNIFICANT CHANGE UP (ref 80–100)
MONOCYTES # BLD AUTO: 0.57 K/UL — SIGNIFICANT CHANGE UP (ref 0–0.9)
MONOCYTES NFR BLD AUTO: 9.6 % — SIGNIFICANT CHANGE UP (ref 2–14)
NEUTROPHILS # BLD AUTO: 3.91 K/UL — SIGNIFICANT CHANGE UP (ref 1.8–7.4)
NEUTROPHILS NFR BLD AUTO: 65.6 % — SIGNIFICANT CHANGE UP (ref 43–77)
PLATELET # BLD AUTO: 148 K/UL — LOW (ref 150–400)
POTASSIUM SERPL-MCNC: 4.3 MMOL/L — SIGNIFICANT CHANGE UP (ref 3.5–5.3)
POTASSIUM SERPL-SCNC: 4.3 MMOL/L — SIGNIFICANT CHANGE UP (ref 3.5–5.3)
PROT SERPL-MCNC: 7.1 GM/DL — SIGNIFICANT CHANGE UP (ref 6–8.3)
PROTHROM AB SERPL-ACNC: 11.1 SEC — SIGNIFICANT CHANGE UP (ref 9.9–13.4)
RBC # BLD: 5.09 M/UL — SIGNIFICANT CHANGE UP (ref 4.2–5.8)
RBC # FLD: 12.1 % — SIGNIFICANT CHANGE UP (ref 10.3–14.5)
SODIUM SERPL-SCNC: 137 MMOL/L — SIGNIFICANT CHANGE UP (ref 135–145)
TROPONIN I, HIGH SENSITIVITY RESULT: 10.28 NG/L — SIGNIFICANT CHANGE UP
WBC # BLD: 5.96 K/UL — SIGNIFICANT CHANGE UP (ref 3.8–10.5)
WBC # FLD AUTO: 5.96 K/UL — SIGNIFICANT CHANGE UP (ref 3.8–10.5)

## 2024-12-08 PROCEDURE — 71045 X-RAY EXAM CHEST 1 VIEW: CPT | Mod: 26

## 2024-12-08 PROCEDURE — 96374 THER/PROPH/DIAG INJ IV PUSH: CPT | Mod: XU

## 2024-12-08 PROCEDURE — 85379 FIBRIN DEGRADATION QUANT: CPT

## 2024-12-08 PROCEDURE — 84484 ASSAY OF TROPONIN QUANT: CPT

## 2024-12-08 PROCEDURE — 99285 EMERGENCY DEPT VISIT HI MDM: CPT

## 2024-12-08 PROCEDURE — 85730 THROMBOPLASTIN TIME PARTIAL: CPT

## 2024-12-08 PROCEDURE — 85610 PROTHROMBIN TIME: CPT

## 2024-12-08 PROCEDURE — 85025 COMPLETE CBC W/AUTO DIFF WBC: CPT

## 2024-12-08 PROCEDURE — 71275 CT ANGIOGRAPHY CHEST: CPT | Mod: MC

## 2024-12-08 PROCEDURE — 99285 EMERGENCY DEPT VISIT HI MDM: CPT | Mod: 25

## 2024-12-08 PROCEDURE — 93010 ELECTROCARDIOGRAM REPORT: CPT

## 2024-12-08 PROCEDURE — 36415 COLL VENOUS BLD VENIPUNCTURE: CPT

## 2024-12-08 PROCEDURE — 80053 COMPREHEN METABOLIC PANEL: CPT

## 2024-12-08 PROCEDURE — 93005 ELECTROCARDIOGRAM TRACING: CPT

## 2024-12-08 PROCEDURE — 71045 X-RAY EXAM CHEST 1 VIEW: CPT

## 2024-12-08 PROCEDURE — 71275 CT ANGIOGRAPHY CHEST: CPT | Mod: 26,MC

## 2024-12-08 RX ADMIN — Medication 2 MILLIGRAM(S): at 16:47

## 2024-12-08 NOTE — ED PROVIDER NOTE - PHYSICAL EXAMINATION
Gen'l: Overweight WM adult in NAD, no respiratory discomfort, no sentence shortening, not acutely ill.  Head: NC/AT  Eyes: PERRL, EOMI  ENT: O/P clear, mmm  CV: RRR, normal radial pulse  Lungs: CTA, normal respirations  GI: soft, NT, BS+  : Deferred, no flank nor CVAT  Neck: NT, supple w/o pain  MSK: DAVE x 4, no focal extremity swelling nor tenderness, B/L SLR 35 degrees w/o pain, normal motor  Skin: no tactile warmth, no rash  Neuro: A+O x 4, CN 2 -12 intact, normal speech, no focal motor/sensory deficits

## 2024-12-08 NOTE — ED ADULT NURSE NOTE - NSFALLUNIVINTERV_ED_ALL_ED
Bed/Stretcher in lowest position, wheels locked, appropriate side rails in place/Call bell, personal items and telephone in reach/Instruct patient to call for assistance before getting out of bed/chair/stretcher/Non-slip footwear applied when patient is off stretcher/Gans to call system/Physically safe environment - no spills, clutter or unnecessary equipment/Purposeful proactive rounding/Room/bathroom lighting operational, light cord in reach

## 2024-12-08 NOTE — ED PROVIDER NOTE - CARE PROVIDER_API CALL
Karan Mariee  Cardiovascular Disease  [address], [address]  [place] [address]  Phone: [phone]  Fax: [phone]  Follow Up Time:

## 2024-12-08 NOTE — ED ADULT TRIAGE NOTE - CHIEF COMPLAINT QUOTE
Pt c/o nonradiating, constant L sided chest pain x1 week with worsening pain starting yesterday. HX of mitral valve repair in 2009. Endorses mild SOB and dizziness. STAT EKG to be completed.

## 2024-12-08 NOTE — ED PROVIDER NOTE - OBJECTIVE STATEMENT
70 y/o WM, PMH of HTN, HLD, mitral valve repair '09 presents to the ED BIB private car via wife c/o 1 week of mild left-sided chest discomfort, mild SOB, worsened yesterday and again this AM. Chest pain is aching, non-radiating, slightly pleuritic, also aggravated by coughing. +Cough. Subjective fever yesterday with mild headache. Denies photophobia, neck stiffness, rash, AMS, near syncope, LOC. No known ill contacts. No associated diaphoresis, palpitations, n/v, ARIANA, myalgias. Last weekend patient and wife did a lot of yard work, chest pain and SOB started subsequently this past week but flare-up yesterday and this morning without known precipitant. Currently with slight lightheadedness, slight chest pain, but no acute SOB.   PCP: Dr. Grewal  Cardio: Dr. Mariee 68 y/o WM, PMH of HTN, HLD, mitral valve repair '09, BIB private car via wife to ED c/o 1 week of mild left-sided chest discomfort, mild SOB, worsened yesterday and again this AM. Chest pain is aching, non-radiating, slightly pleuritic, also aggravated by coughing. +Cough, non-productive. Subjective fever yesterday with mild headache. Denies photophobia, neck stiffness, rash, AMS, near-syncope, LOC. No known ill contacts. No associated diaphoresis, palpitations, N/V, ARIANA, myalgias. Last weekend patient and wife did a lot of yard work; chest pain and SOB started subsequently this past week but + acute flare-up yesterday and this morning without known precipitant. Currently with slight lightheadedness, slight chest pain, but no acute SOB.   PCP: Dr. Grewal  Cardio: Dr. Mariee

## 2024-12-08 NOTE — ED PROVIDER NOTE - CLINICAL SUMMARY MEDICAL DECISION MAKING FREE TEXT BOX
68 y/o WM, PMH of HTN, HLD, mitral valve repair '09 presents to the ED BIB private car via wife c/o 1 week of mild left-sided chest discomfort, mild SOB, worsened yesterday and again this AM. Chest pain is aching, non-radiating, slightly pleuritic, also aggravated by coughing. +Cough. Subjective fever yesterday with mild headache. Currently with slight lightheadedness and slight chest pain, but no acute SOB. Patient in no acute distress.  Plan: EKG, CXR, labs including d-dimer, troponin, coags. Monitor, observe, reassess. 68 y/o WM, PMH of HTN, HLD, mitral valve repair '09 presents to the ED BIB private car via wife c/o 1 week of mild left-sided chest discomfort, mild SOB, worsened yesterday and again this AM. Chest pain is aching, non-radiating, slightly pleuritic, also aggravated by coughing. +Cough. Subjective fever yesterday with mild headache. Currently with slight lightheadedness and slight chest pain, but no acute SOB. Patient in no acute distress.  Plan: EKG, CXR, labs including d-dimer, troponin, coags. Monitor, observe, reassess.    16:08, Geronimo Nielsen for Dr. Meehan: Labs notable for elevated d-dimer, normal troponin. CXR report negative. CTA pending for r/o PE. Case discussed with Dr. Fahad wood for Dr. Mariee: agrees if CTA negative, patient may be DCed home for close cardiology f/u in the next 1-2 days.    17:50, IRIS Meehan MD:  CTA chest report: No acute pathology.  Reassessed, currently chest pain, SOB–Free, no active complaints.  Palpable left chest wall tenderness, reproducing patient's chest pain complaint.  ACS unlikely, suspect muscular cause of chest pain after having done physical activity manual exertion and raking last weekend.  All results discussed with patient and wife at bedside, they expressed their understanding and agree with DC home, call cardiology office tomorrow to expedite office follow-up next 1 to 2 days. 68 y/o WM, PMH of HTN, HLD, mitral valve repair '09, BIB private car via wife c/o 1 week of mild left-sided chest discomfort, mild SOB, worsened yesterday and again this AM. Chest pain is aching, non-radiating, slightly pleuritic, also aggravated by coughing. + Dry cough. Subjective fever yesterday with mild headache. Currently with slight lightheadedness and slight chest pain, but no acute SOB. Patient in no acute distress. Last weekend patient and wife did a lot of yard work; chest pain and SOB started subsequently this past week but + acute flare-up yesterday and this morning without known precipitant.  Plan: EKG, CXR, labs including d-dimer, troponin, coags. Monitor, observe, reassess. No need for rpt. Troponin, as cp > 3 hours today.    16:08, Geronimo Nielsen for Dr. Meehan: Labs notable for elevated d-dimer, normal Troponin. CXR report negative. CTA pending for r/o PE. Case discussed with Dr. Fahad wood for Dr. Mariee: agrees if CTA negative, patient may be NV'ed home for close Cardiology f/u in the next 1-2 days.    17:50, IRIS Meehan MD:  CTA chest report: No acute pathology.  Pt reassessed, currently chest pain, SOB–free, no active complaints.  Palpable left chest wall tenderness, reproducing patient's chest pain complaint.  ACS unlikely, suspect muscular cause of chest pain after having done physical activity/manual exertion and raking last weekend.  All results discussed with patient and wife at bedside, they expressed their understanding and agree with DC home, call Cardiology office tomorrow to expedite office follow-up next 1 to 2 days.

## 2024-12-08 NOTE — ED PROVIDER NOTE - PROGRESS NOTE DETAILS
Geronimo Nielsen for Dr. Meehan: Labs notable for elevated d-dimer, normal troponin. CXR report negative. CTA pending for r/o PE. Case discussed with Dr. Fahad wood for Dr. Mariee: agrees if CTA negative, patient may be DCed home for close cardiology f/u in the next 1-2 days. Geronimo Nielsen for Dr. Meehan: Labs notable for elevated d-dimer, normal Troponin. CXR report negative. CTA pending for r/o PE. Case discussed with Dr. Fahad wood for Dr. Mariee: agrees if CTA negative, patient may be DC'ed home for close Cardiology f/u in the next 1-2 days.

## 2024-12-08 NOTE — ED PROVIDER NOTE - NSFOLLOWUPINSTRUCTIONS_ED_ALL_ED_FT
Motrin/Advil 200 mg 2 tablets with food up to 3 times a day as needed for discomfort.    Continue your regular medications as per routine.    Follow-up tomorrow with your cardiologist, Dr. Mariee.  Call office in morning to expedite appointment.    Avoid heavy lifting, physical exertion.      Chest Pain    Chest pain can be caused by many different conditions which may or may not be dangerous. Causes include heartburn, lung infections, heart attack, blood clot in lungs, skin infections, strain or damage to muscle, cartilage, or bones, etc. In addition to a history and physical examination, an electrocardiogram (ECG) or other lab tests may have been performed to determine the cause of your chest pain. Follow up with your primary care provider or with a cardiologist as instructed.     SEEK IMMEDIATE MEDICAL CARE IF YOU HAVE ANY OF THE FOLLOWING SYMPTOMS: worsening chest pain, coughing up blood, unexplained back/neck/jaw pain, severe abdominal pain, dizziness or lightheadedness, fainting, shortness of breath, sweaty or clammy skin, vomiting, or racing heart beat. These symptoms may represent a serious problem that is an emergency. Do not wait to see if the symptoms will go away. Get medical help right away. Call 911 and do not drive yourself to the hospital.    I

## 2024-12-08 NOTE — ED ADULT NURSE NOTE - OBJECTIVE STATEMENT
Patient presents to the ER with complaints of non radiating left sided chest pain starting yesterday associated with shortness of breath. Denies N/V/D, fever, headache, dizziness. Took all daily medications this morning.

## 2024-12-08 NOTE — ED PROVIDER NOTE - CARE PLAN
Principal Discharge DX:	Chest pain with low risk of acute coronary syndrome  Secondary Diagnosis:	Chest pain, muscular   1

## 2024-12-08 NOTE — ED PROVIDER NOTE - PATIENT PORTAL LINK FT
You can access the FollowMyHealth Patient Portal offered by [facility] by registering at the following website: [email or web address]. By joining [employer]’s FollowMyHealth portal, you will also be able to view your health information using other applications (apps) compatible with our system.

## 2024-12-12 ENCOUNTER — APPOINTMENT (OUTPATIENT)
Dept: CARDIOLOGY | Facility: CLINIC | Age: 69
End: 2024-12-12
Payer: MEDICARE

## 2024-12-12 ENCOUNTER — NON-APPOINTMENT (OUTPATIENT)
Age: 69
End: 2024-12-12

## 2024-12-12 VITALS
HEART RATE: 43 BPM | BODY MASS INDEX: 33.62 KG/M2 | WEIGHT: 262 LBS | DIASTOLIC BLOOD PRESSURE: 70 MMHG | HEIGHT: 74 IN | OXYGEN SATURATION: 100 % | SYSTOLIC BLOOD PRESSURE: 122 MMHG

## 2024-12-12 DIAGNOSIS — R07.9 CHEST PAIN, UNSPECIFIED: ICD-10-CM

## 2024-12-12 DIAGNOSIS — I77.810 THORACIC AORTIC ECTASIA: ICD-10-CM

## 2024-12-12 PROCEDURE — G2211 COMPLEX E/M VISIT ADD ON: CPT

## 2024-12-12 PROCEDURE — 93000 ELECTROCARDIOGRAM COMPLETE: CPT

## 2024-12-12 PROCEDURE — 99214 OFFICE O/P EST MOD 30 MIN: CPT

## 2024-12-15 LAB
ALBUMIN SERPL ELPH-MCNC: 4.3 G/DL
ALP BLD-CCNC: 91 U/L
ALT SERPL-CCNC: 20 U/L
ANION GAP SERPL CALC-SCNC: 10 MMOL/L
AST SERPL-CCNC: 21 U/L
BILIRUB SERPL-MCNC: 0.4 MG/DL
BUN SERPL-MCNC: 24 MG/DL
CALCIUM SERPL-MCNC: 9 MG/DL
CHLORIDE SERPL-SCNC: 104 MMOL/L
CO2 SERPL-SCNC: 24 MMOL/L
CREAT SERPL-MCNC: 1.29 MG/DL
EGFR: 60 ML/MIN/1.73M2
GLUCOSE SERPL-MCNC: 90 MG/DL
MAGNESIUM SERPL-MCNC: 1.7 MG/DL
POTASSIUM SERPL-SCNC: 4.6 MMOL/L
PROT SERPL-MCNC: 6.6 G/DL
SODIUM SERPL-SCNC: 137 MMOL/L

## 2024-12-16 ENCOUNTER — TRANSCRIPTION ENCOUNTER (OUTPATIENT)
Age: 69
End: 2024-12-16

## 2024-12-16 LAB — TSH SERPL-ACNC: 4.86 UIU/ML

## 2024-12-19 ENCOUNTER — APPOINTMENT (OUTPATIENT)
Dept: CARDIOLOGY | Facility: CLINIC | Age: 69
End: 2024-12-19
Payer: MEDICARE

## 2024-12-19 PROCEDURE — 93306 TTE W/DOPPLER COMPLETE: CPT

## 2024-12-20 ENCOUNTER — TRANSCRIPTION ENCOUNTER (OUTPATIENT)
Age: 69
End: 2024-12-20

## 2024-12-20 DIAGNOSIS — E78.5 HYPERLIPIDEMIA, UNSPECIFIED: ICD-10-CM

## 2024-12-20 DIAGNOSIS — I10 ESSENTIAL (PRIMARY) HYPERTENSION: ICD-10-CM

## 2025-01-09 ENCOUNTER — APPOINTMENT (OUTPATIENT)
Dept: CT IMAGING | Facility: CLINIC | Age: 70
End: 2025-01-09
Payer: MEDICARE

## 2025-01-09 ENCOUNTER — OUTPATIENT (OUTPATIENT)
Dept: OUTPATIENT SERVICES | Facility: HOSPITAL | Age: 70
LOS: 1 days | End: 2025-01-09
Payer: MEDICARE

## 2025-01-09 DIAGNOSIS — Z98.890 OTHER SPECIFIED POSTPROCEDURAL STATES: Chronic | ICD-10-CM

## 2025-01-09 DIAGNOSIS — R07.9 CHEST PAIN, UNSPECIFIED: ICD-10-CM

## 2025-01-09 PROCEDURE — 75574 CT ANGIO HRT W/3D IMAGE: CPT

## 2025-01-09 PROCEDURE — 75574 CT ANGIO HRT W/3D IMAGE: CPT | Mod: 26

## 2025-01-10 ENCOUNTER — APPOINTMENT (OUTPATIENT)
Dept: CARDIOLOGY | Facility: CLINIC | Age: 70
End: 2025-01-10

## 2025-01-23 ENCOUNTER — APPOINTMENT (OUTPATIENT)
Dept: CARDIOLOGY | Facility: CLINIC | Age: 70
End: 2025-01-23
Payer: MEDICARE

## 2025-01-23 ENCOUNTER — NON-APPOINTMENT (OUTPATIENT)
Age: 70
End: 2025-01-23

## 2025-01-23 VITALS
OXYGEN SATURATION: 98 % | BODY MASS INDEX: 33.37 KG/M2 | HEART RATE: 69 BPM | HEIGHT: 74 IN | DIASTOLIC BLOOD PRESSURE: 60 MMHG | SYSTOLIC BLOOD PRESSURE: 108 MMHG | WEIGHT: 260 LBS

## 2025-01-23 DIAGNOSIS — I49.3 VENTRICULAR PREMATURE DEPOLARIZATION: ICD-10-CM

## 2025-01-23 DIAGNOSIS — R07.9 CHEST PAIN, UNSPECIFIED: ICD-10-CM

## 2025-01-23 DIAGNOSIS — I34.1 NONRHEUMATIC MITRAL (VALVE) PROLAPSE: ICD-10-CM

## 2025-01-23 DIAGNOSIS — E78.5 HYPERLIPIDEMIA, UNSPECIFIED: ICD-10-CM

## 2025-01-23 PROCEDURE — 99214 OFFICE O/P EST MOD 30 MIN: CPT

## 2025-01-23 PROCEDURE — G2211 COMPLEX E/M VISIT ADD ON: CPT

## 2025-01-23 PROCEDURE — 93000 ELECTROCARDIOGRAM COMPLETE: CPT

## 2025-01-31 ENCOUNTER — TRANSCRIPTION ENCOUNTER (OUTPATIENT)
Age: 70
End: 2025-01-31

## 2025-02-06 ENCOUNTER — APPOINTMENT (OUTPATIENT)
Dept: INTERNAL MEDICINE | Facility: CLINIC | Age: 70
End: 2025-02-06

## 2025-03-21 ENCOUNTER — NON-APPOINTMENT (OUTPATIENT)
Age: 70
End: 2025-03-21

## 2025-03-21 ENCOUNTER — APPOINTMENT (OUTPATIENT)
Dept: ELECTROPHYSIOLOGY | Facility: CLINIC | Age: 70
End: 2025-03-21

## 2025-03-21 VITALS
HEIGHT: 74 IN | OXYGEN SATURATION: 99 % | SYSTOLIC BLOOD PRESSURE: 154 MMHG | HEART RATE: 76 BPM | BODY MASS INDEX: 31.7 KG/M2 | WEIGHT: 247 LBS | DIASTOLIC BLOOD PRESSURE: 73 MMHG

## 2025-03-21 DIAGNOSIS — I49.3 VENTRICULAR PREMATURE DEPOLARIZATION: ICD-10-CM

## 2025-03-21 DIAGNOSIS — I38 ENDOCARDITIS, VALVE UNSPECIFIED: ICD-10-CM

## 2025-03-21 PROCEDURE — 99215 OFFICE O/P EST HI 40 MIN: CPT

## 2025-03-21 PROCEDURE — 93000 ELECTROCARDIOGRAM COMPLETE: CPT

## 2025-03-21 PROCEDURE — G2211 COMPLEX E/M VISIT ADD ON: CPT

## 2025-03-26 ENCOUNTER — NON-APPOINTMENT (OUTPATIENT)
Age: 70
End: 2025-03-26

## 2025-03-27 ENCOUNTER — NON-APPOINTMENT (OUTPATIENT)
Age: 70
End: 2025-03-27

## 2025-03-27 ENCOUNTER — APPOINTMENT (OUTPATIENT)
Dept: CARDIOLOGY | Facility: CLINIC | Age: 70
End: 2025-03-27
Payer: MEDICARE

## 2025-03-27 VITALS
SYSTOLIC BLOOD PRESSURE: 112 MMHG | HEIGHT: 74 IN | WEIGHT: 248 LBS | BODY MASS INDEX: 31.83 KG/M2 | DIASTOLIC BLOOD PRESSURE: 68 MMHG

## 2025-03-27 DIAGNOSIS — I47.29 OTHER VENTRICULAR TACHYCARDIA: ICD-10-CM

## 2025-03-27 DIAGNOSIS — E78.5 HYPERLIPIDEMIA, UNSPECIFIED: ICD-10-CM

## 2025-03-27 DIAGNOSIS — Z95.2 PRESENCE OF PROSTHETIC HEART VALVE: ICD-10-CM

## 2025-03-27 PROCEDURE — 93000 ELECTROCARDIOGRAM COMPLETE: CPT

## 2025-03-27 PROCEDURE — G2211 COMPLEX E/M VISIT ADD ON: CPT

## 2025-03-27 PROCEDURE — 99214 OFFICE O/P EST MOD 30 MIN: CPT

## 2025-05-05 ENCOUNTER — TRANSCRIPTION ENCOUNTER (OUTPATIENT)
Age: 70
End: 2025-05-05

## 2025-05-06 ENCOUNTER — TRANSCRIPTION ENCOUNTER (OUTPATIENT)
Age: 70
End: 2025-05-06

## 2025-05-20 ENCOUNTER — OUTPATIENT (OUTPATIENT)
Dept: OUTPATIENT SERVICES | Facility: HOSPITAL | Age: 70
LOS: 1 days | End: 2025-05-20
Payer: MEDICARE

## 2025-05-20 ENCOUNTER — APPOINTMENT (OUTPATIENT)
Dept: MRI IMAGING | Facility: CLINIC | Age: 70
End: 2025-05-20

## 2025-05-20 DIAGNOSIS — I34.1 NONRHEUMATIC MITRAL (VALVE) PROLAPSE: ICD-10-CM

## 2025-05-20 DIAGNOSIS — I49.3 VENTRICULAR PREMATURE DEPOLARIZATION: ICD-10-CM

## 2025-05-20 DIAGNOSIS — Z98.890 OTHER SPECIFIED POSTPROCEDURAL STATES: Chronic | ICD-10-CM

## 2025-05-20 PROCEDURE — 75565 CARD MRI VELOC FLOW MAPPING: CPT | Mod: 26

## 2025-05-20 PROCEDURE — 75561 CARDIAC MRI FOR MORPH W/DYE: CPT | Mod: 26

## 2025-05-20 PROCEDURE — 75565 CARD MRI VELOC FLOW MAPPING: CPT

## 2025-05-20 PROCEDURE — 75561 CARDIAC MRI FOR MORPH W/DYE: CPT

## 2025-05-20 PROCEDURE — A9585: CPT

## 2025-06-03 ENCOUNTER — APPOINTMENT (OUTPATIENT)
Dept: ELECTROPHYSIOLOGY | Facility: CLINIC | Age: 70
End: 2025-06-03

## 2025-06-03 VITALS
SYSTOLIC BLOOD PRESSURE: 132 MMHG | BODY MASS INDEX: 30.54 KG/M2 | HEIGHT: 74 IN | WEIGHT: 238 LBS | DIASTOLIC BLOOD PRESSURE: 92 MMHG | OXYGEN SATURATION: 99 % | HEART RATE: 38 BPM

## 2025-06-03 DIAGNOSIS — I47.29 OTHER VENTRICULAR TACHYCARDIA: ICD-10-CM

## 2025-06-03 DIAGNOSIS — I49.3 VENTRICULAR PREMATURE DEPOLARIZATION: ICD-10-CM

## 2025-06-03 PROCEDURE — G2211 COMPLEX E/M VISIT ADD ON: CPT

## 2025-06-03 PROCEDURE — 99214 OFFICE O/P EST MOD 30 MIN: CPT

## 2025-06-10 RX ORDER — HYDROCORTISONE 25 MG/G
2.5 CREAM TOPICAL
Qty: 1 | Refills: 1 | Status: ACTIVE | COMMUNITY
Start: 2025-06-10 | End: 1900-01-01

## 2025-07-15 NOTE — ED PROVIDER NOTE - QRS
My signature below certifies that the above stated patient is homebound and upon completion of the Face-To-Face encounter, has the need for intermittent skilled nursing, physical therapy and/or speech or occupational therapy services in their home for their current diagnosis as outlined in their initial plan of care. These services will continue to be monitored by myself or another physician. DAMARIS

## 2025-08-05 ENCOUNTER — TRANSCRIPTION ENCOUNTER (OUTPATIENT)
Age: 70
End: 2025-08-05

## 2025-08-19 ENCOUNTER — TRANSCRIPTION ENCOUNTER (OUTPATIENT)
Age: 70
End: 2025-08-19

## 2025-09-02 ENCOUNTER — APPOINTMENT (OUTPATIENT)
Dept: INTERNAL MEDICINE | Facility: CLINIC | Age: 70
End: 2025-09-02
Payer: MEDICARE

## 2025-09-02 VITALS
HEIGHT: 74 IN | DIASTOLIC BLOOD PRESSURE: 80 MMHG | BODY MASS INDEX: 30.93 KG/M2 | RESPIRATION RATE: 16 BRPM | HEART RATE: 57 BPM | TEMPERATURE: 98 F | OXYGEN SATURATION: 97 % | SYSTOLIC BLOOD PRESSURE: 114 MMHG | WEIGHT: 241 LBS

## 2025-09-02 DIAGNOSIS — R97.20 ELEVATED PROSTATE, SPECIFIC ANTIGEN [PSA]: ICD-10-CM

## 2025-09-02 DIAGNOSIS — N40.0 BENIGN PROSTATIC HYPERPLASIA WITHOUT LOWER URINARY TRACT SYMPMS: ICD-10-CM

## 2025-09-02 DIAGNOSIS — G47.33 OBSTRUCTIVE SLEEP APNEA (ADULT) (PEDIATRIC): ICD-10-CM

## 2025-09-02 DIAGNOSIS — Z00.00 ENCOUNTER FOR GENERAL ADULT MEDICAL EXAMINATION W/OUT ABNORMAL FINDINGS: ICD-10-CM

## 2025-09-02 PROCEDURE — G0439: CPT

## 2025-09-02 PROCEDURE — 99213 OFFICE O/P EST LOW 20 MIN: CPT | Mod: 25
